# Patient Record
Sex: FEMALE | Race: BLACK OR AFRICAN AMERICAN | NOT HISPANIC OR LATINO | Employment: FULL TIME | URBAN - METROPOLITAN AREA
[De-identification: names, ages, dates, MRNs, and addresses within clinical notes are randomized per-mention and may not be internally consistent; named-entity substitution may affect disease eponyms.]

---

## 2017-01-04 ENCOUNTER — HISTORICAL (OUTPATIENT)
Dept: RADIOLOGY | Facility: HOSPITAL | Age: 52
End: 2017-01-04

## 2017-03-10 ENCOUNTER — HISTORICAL (OUTPATIENT)
Dept: LAB | Facility: HOSPITAL | Age: 52
End: 2017-03-10

## 2017-03-13 ENCOUNTER — HISTORICAL (OUTPATIENT)
Dept: CARDIOLOGY | Facility: HOSPITAL | Age: 52
End: 2017-03-13

## 2017-04-12 ENCOUNTER — HISTORICAL (OUTPATIENT)
Dept: INTENSIVE CARE | Facility: HOSPITAL | Age: 52
End: 2017-04-12

## 2017-05-10 ENCOUNTER — HISTORICAL (OUTPATIENT)
Dept: INTENSIVE CARE | Facility: HOSPITAL | Age: 52
End: 2017-05-10

## 2017-12-20 ENCOUNTER — HISTORICAL (OUTPATIENT)
Dept: MEDSURG UNIT | Facility: HOSPITAL | Age: 52
End: 2017-12-20

## 2017-12-21 LAB
ABS NEUT (OLG): 6.82 X10(3)/MCL (ref 2.1–9.2)
ALBUMIN SERPL-MCNC: 3.6 GM/DL (ref 3.4–5)
ALBUMIN/GLOB SERPL: 0.9 RATIO (ref 1.1–2)
ALP SERPL-CCNC: 107 UNIT/L (ref 38–126)
ALT SERPL-CCNC: 83 UNIT/L (ref 12–78)
AST SERPL-CCNC: 68 UNIT/L (ref 15–37)
BASOPHILS # BLD AUTO: 0 X10(3)/MCL (ref 0–0.2)
BASOPHILS NFR BLD AUTO: 0 %
BILIRUB SERPL-MCNC: 0.3 MG/DL (ref 0.2–1)
BILIRUBIN DIRECT+TOT PNL SERPL-MCNC: 0.1
BILIRUBIN DIRECT+TOT PNL SERPL-MCNC: 0.2 MG/DL (ref 0–0.8)
BUN SERPL-MCNC: 14 MG/DL (ref 7–18)
CALCIUM SERPL-MCNC: 9.1 MG/DL (ref 8.5–10.1)
CHLORIDE SERPL-SCNC: 98 MMOL/L (ref 98–107)
CO2 SERPL-SCNC: 32 MMOL/L (ref 21–32)
CREAT SERPL-MCNC: 0.68 MG/DL (ref 0.55–1.02)
ERYTHROCYTE [DISTWIDTH] IN BLOOD BY AUTOMATED COUNT: 14.4 % (ref 11.5–17)
GLOBULIN SER-MCNC: 4 GM/DL (ref 2.4–3.5)
GLUCOSE SERPL-MCNC: 208 MG/DL (ref 74–106)
HCT VFR BLD AUTO: 45 % (ref 37–47)
HGB BLD-MCNC: 13.7 GM/DL (ref 12–16)
LYMPHOCYTES # BLD AUTO: 1.4 X10(3)/MCL (ref 0.6–4.6)
LYMPHOCYTES NFR BLD AUTO: 17 %
MCH RBC QN AUTO: 25.5 PG (ref 27–31)
MCHC RBC AUTO-ENTMCNC: 30.4 GM/DL (ref 33–36)
MCV RBC AUTO: 83.6 FL (ref 80–94)
MONOCYTES # BLD AUTO: 0.2 X10(3)/MCL (ref 0.1–1.3)
MONOCYTES NFR BLD AUTO: 2 %
NEUTROPHILS # BLD AUTO: 6.82 X10(3)/MCL (ref 2.1–9.2)
NEUTROPHILS NFR BLD AUTO: 81 %
PLATELET # BLD AUTO: 207 X10(3)/MCL (ref 130–400)
PMV BLD AUTO: 12.4 FL (ref 9.4–12.4)
POTASSIUM SERPL-SCNC: 4.2 MMOL/L (ref 3.5–5.1)
PROT SERPL-MCNC: 7.6 GM/DL (ref 6.4–8.2)
RBC # BLD AUTO: 5.38 X10(6)/MCL (ref 4.2–5.4)
SODIUM SERPL-SCNC: 136 MMOL/L (ref 136–145)
WBC # SPEC AUTO: 8.5 X10(3)/MCL (ref 4.5–11.5)

## 2018-03-19 ENCOUNTER — HISTORICAL (OUTPATIENT)
Dept: RADIOLOGY | Facility: HOSPITAL | Age: 53
End: 2018-03-19

## 2018-12-04 ENCOUNTER — HISTORICAL (OUTPATIENT)
Dept: ADMINISTRATIVE | Facility: HOSPITAL | Age: 53
End: 2018-12-04

## 2019-09-06 ENCOUNTER — HISTORICAL (OUTPATIENT)
Dept: ADMINISTRATIVE | Facility: HOSPITAL | Age: 54
End: 2019-09-06

## 2019-09-06 LAB
BUN SERPL-MCNC: 18 MG/DL (ref 7–18)
CALCIUM SERPL-MCNC: 9.1 MG/DL (ref 8.5–10.1)
CHLORIDE SERPL-SCNC: 102 MMOL/L (ref 98–107)
CO2 SERPL-SCNC: 32 MMOL/L (ref 21–32)
CREAT SERPL-MCNC: 0.7 MG/DL (ref 0.55–1.02)
CREAT/UREA NIT SERPL: 25.7
GLUCOSE SERPL-MCNC: 92 MG/DL (ref 74–106)
POTASSIUM SERPL-SCNC: 4.1 MMOL/L (ref 3.5–5.1)
SODIUM SERPL-SCNC: 141 MMOL/L (ref 136–145)

## 2019-11-06 ENCOUNTER — HISTORICAL (OUTPATIENT)
Dept: RADIOLOGY | Facility: HOSPITAL | Age: 54
End: 2019-11-06

## 2019-11-15 ENCOUNTER — HISTORICAL (OUTPATIENT)
Dept: RADIOLOGY | Facility: HOSPITAL | Age: 54
End: 2019-11-15

## 2020-05-21 ENCOUNTER — HISTORICAL (OUTPATIENT)
Dept: LAB | Facility: HOSPITAL | Age: 55
End: 2020-05-21

## 2020-10-15 ENCOUNTER — HISTORICAL (OUTPATIENT)
Dept: LAB | Facility: HOSPITAL | Age: 55
End: 2020-10-15

## 2021-01-18 ENCOUNTER — HISTORICAL (OUTPATIENT)
Dept: RESPIRATORY THERAPY | Facility: HOSPITAL | Age: 56
End: 2021-01-18

## 2021-05-27 ENCOUNTER — HOSPITAL ENCOUNTER (OUTPATIENT)
Dept: MEDSURG UNIT | Facility: HOSPITAL | Age: 56
End: 2021-05-28
Attending: SPECIALIST | Admitting: SPECIALIST

## 2021-05-28 LAB
BUN SERPL-MCNC: 13 MG/DL (ref 9.8–20.1)
CALCIUM SERPL-MCNC: 8.5 MG/DL (ref 8.4–10.2)
CHLORIDE SERPL-SCNC: 101 MMOL/L (ref 98–107)
CO2 SERPL-SCNC: 31 MMOL/L (ref 22–29)
CREAT SERPL-MCNC: 0.75 MG/DL (ref 0.55–1.02)
CREAT/UREA NIT SERPL: 17
GLUCOSE SERPL-MCNC: 148 MG/DL (ref 74–100)
HCT VFR BLD AUTO: 36.6 % (ref 36–48)
HGB BLD-MCNC: 10.9 GM/DL (ref 12–16)
POTASSIUM SERPL-SCNC: 3.9 MMOL/L (ref 3.5–5.1)
SODIUM SERPL-SCNC: 138 MMOL/L (ref 136–145)

## 2021-06-09 ENCOUNTER — HISTORICAL (OUTPATIENT)
Dept: RADIOLOGY | Facility: HOSPITAL | Age: 56
End: 2021-06-09

## 2021-07-07 ENCOUNTER — HISTORICAL (OUTPATIENT)
Dept: RADIOLOGY | Facility: HOSPITAL | Age: 56
End: 2021-07-07

## 2021-07-29 ENCOUNTER — HISTORICAL (OUTPATIENT)
Dept: RADIOLOGY | Facility: HOSPITAL | Age: 56
End: 2021-07-29

## 2021-08-18 ENCOUNTER — HISTORICAL (OUTPATIENT)
Dept: RADIOLOGY | Facility: HOSPITAL | Age: 56
End: 2021-08-18

## 2021-10-20 ENCOUNTER — HISTORICAL (OUTPATIENT)
Dept: RADIOLOGY | Facility: HOSPITAL | Age: 56
End: 2021-10-20

## 2021-12-01 ENCOUNTER — HISTORICAL (OUTPATIENT)
Dept: RADIOLOGY | Facility: HOSPITAL | Age: 56
End: 2021-12-01

## 2022-02-02 ENCOUNTER — HISTORICAL (OUTPATIENT)
Dept: RADIOLOGY | Facility: HOSPITAL | Age: 57
End: 2022-02-02

## 2022-02-02 ENCOUNTER — HISTORICAL (OUTPATIENT)
Dept: ADMINISTRATIVE | Facility: HOSPITAL | Age: 57
End: 2022-02-02

## 2022-02-04 ENCOUNTER — HISTORICAL (OUTPATIENT)
Dept: ADMINISTRATIVE | Facility: HOSPITAL | Age: 57
End: 2022-02-04

## 2022-02-04 ENCOUNTER — HISTORICAL (OUTPATIENT)
Dept: RADIOLOGY | Facility: HOSPITAL | Age: 57
End: 2022-02-04

## 2022-02-14 ENCOUNTER — HISTORICAL (OUTPATIENT)
Dept: ADMINISTRATIVE | Facility: HOSPITAL | Age: 57
End: 2022-02-14

## 2022-02-14 ENCOUNTER — HISTORICAL (OUTPATIENT)
Dept: SURGERY | Facility: HOSPITAL | Age: 57
End: 2022-02-14

## 2022-02-14 LAB
ABS NEUT (OLG): 3.2 (ref 1.5–6.9)
APPEARANCE, UA: NORMAL
B-HCG SERPL QL: NEGATIVE
BACTERIA SPEC CULT: NORMAL
BILIRUB UR QL STRIP: NEGATIVE
BUN SERPL-MCNC: 12 MG/DL (ref 9.8–20.1)
CALCIUM SERPL-MCNC: 9 MG/DL (ref 8.7–10.5)
CHLORIDE SERPL-SCNC: 101 MMOL/L (ref 98–107)
CO2 SERPL-SCNC: 33 MMOL/L (ref 22–29)
COLOR UR: YELLOW
CREAT SERPL-MCNC: 0.73 MG/DL (ref 0.55–1.02)
CREAT/UREA NIT SERPL: 16
DO MICRO?: YES
ERYTHROCYTE [DISTWIDTH] IN BLOOD BY AUTOMATED COUNT: 14.7 % (ref 11.5–17)
GLUCOSE (UA): NEGATIVE
GLUCOSE SERPL-MCNC: 137 MG/DL (ref 74–100)
HCG UR QL IA.RAPID: NEGATIVE
HCG UR QL IA.RAPID: POSITIVE
HCT VFR BLD AUTO: 43.2 % (ref 36–48)
HEMOLYSIS INTERF INDEX SERPL-ACNC: 5
HGB BLD-MCNC: 12.9 G/DL (ref 12–16)
HGB UR QL STRIP: NORMAL
ICTERIC INTERF INDEX SERPL-ACNC: 0
KETONES UR QL STRIP: NEGATIVE
LEUKOCYTE ESTERASE UR QL STRIP: NEGATIVE
LIPEMIC INTERF INDEX SERPL-ACNC: <0
MCH RBC QN AUTO: 25 PG (ref 27–34)
MCHC RBC AUTO-ENTMCNC: 30 G/DL (ref 31–36)
MCV RBC AUTO: 85 FL (ref 80–99)
NITRITE UR QL STRIP: NEGATIVE
PH UR STRIP: 6 [PH] (ref 4.6–8)
PLATELET # BLD AUTO: 159 10*3/UL (ref 140–400)
PMV BLD AUTO: 12.3 FL (ref 6.8–10)
POTASSIUM SERPL-SCNC: 4.1 MMOL/L (ref 3.5–5.1)
PROT UR QL STRIP: NEGATIVE
RBC # BLD AUTO: 5.08 10*6/UL (ref 4.2–5.4)
RBC #/AREA URNS HPF: NORMAL /[HPF] (ref 0–2)
SARS-COV-2 AG RESP QL IA.RAPID: NOT DETECTED
SODIUM SERPL-SCNC: 140 MMOL/L (ref 136–145)
SP GR UR STRIP: 1.01 (ref 1–1.03)
SQUAMOUS EPITHELIAL, UA: NORMAL
UROBILINOGEN UR STRIP-ACNC: 0.2
WBC # SPEC AUTO: 5.6 10*3/UL (ref 4.5–11.5)
WBC #/AREA URNS HPF: NORMAL /[HPF] (ref 0–2)

## 2022-02-16 ENCOUNTER — HISTORICAL (OUTPATIENT)
Dept: ADMINISTRATIVE | Facility: HOSPITAL | Age: 57
End: 2022-02-16

## 2022-02-16 ENCOUNTER — HISTORICAL (OUTPATIENT)
Dept: ANESTHESIOLOGY | Facility: HOSPITAL | Age: 57
End: 2022-02-16

## 2022-04-30 NOTE — OP NOTE
PREOPERATIVE DIAGNOSIS:  Left midshaft spiral humerus fracture.    POSTOPERATIVE DIAGNOSIS:  Left midshaft spiral humerus fracture.    PROCEDURE:  Intramedullary nailing.    ANESTHESIA:  General.    IMPLANT TYPE:  Synthes 7 mm cannulated humeral nail.    COMPLICATIONS:  None.    INDICATION:  The patient tripped and fell, was brought to [facility].  There were no beds at [facility].  She was transferred here in the middle of the night.  Informed consent was obtained.  Risks of the procedure were explained, not excluding infection, bleeding, pain, scarring, neurovascular injury, nonunion, possible need for future surgery, and possible rotator cuff problems.    DESCRIPTION OF PROCEDURE:  She was brought the OR, given IV antibiotics, general anesthesia.  She was morbidly obese.  Placed on the Arthrex shoulder table and secured.  Prepped and draped.  Deltoid incision was made through about 2-3 inches of adipose.  The cuff was identified, carefully inspected.  I made a midline split, and I tagged it with Ethibond for exposure, then put in an entry pin.  Checked it with C-arm, and then did a 10 mm entry hole.  Passed a guidewire down the shaft of the humerus and then down the distal part.  Measured the length, and we selected a 250.  Passed the nail, locked it proximally with a blade, locked it with a 0 end cap, and the nail was appropriately recessed in the humeral head.  Secondary screw proximally and 2 screws distally were placed using fluoro.  She did have some varus of the proximal component, which, of course, is common with this fracture pattern and should not be a     problem.  Following this, the cuff was meticulously repaired with side-to-side 0 Ethibond, subcu #1, 2-0, staples.  Sterile dressing applied.  No complications.        MR/MODL   DD: 05/27/2021 0809   DT: 05/27/2021 0919  Job # [id number]

## 2022-04-30 NOTE — ED PROVIDER NOTES
Patient:   Poppy Weller            MRN: [id number]            FIN: [id number]               Age:   56 years     Sex:  Female     :  1965   Associated Diagnoses:   Left humeral fracture; Atrial fibrillation; Transfer; Shoulder pain-swelling; MAURY - Obstructive sleep apnea; Morbid obesity   Author:   Tonja Velez      Basic Information   Time seen: Date & time 2021 00:20:00.   History source: Patient.   Arrival mode: Ambulance.   History limitation: None.   Additional information: Chief Complaint from Nursing Triage Note : Chief Complaint   2021 0:17 CDT       Chief Complaint           Tx from [facility] with dx of L humerus fx. c/o L knee pain.  .      History of Present Illness   The patient presents with left, shoulder pain.  The onset was just prior to arrival.  The course/duration of symptoms is constant.  Type of injury: fall.  The location where the incident occurred was at home.  Location: Left shoulder. Radiating pain: none. The character of symptoms is pain.  The degree of pain is moderate.  The degree of swelling is none.  The exacerbating factor is movement.  The relieving factor is none.  Risk factors consist of none.  The patient's dominant hand is unknown.  Prior episodes: none.  Therapy today: transfer from another hospital.  Associated symptoms: none.  Additional history: she tripped bringing [employer] cans to the road and fell on her arm. none .        Review of Systems   Constitutional symptoms:  Negative except as documented in HPI, no fever, no chills, no sweats, no weakness, no fatigue, no decreased activity.    Skin symptoms:  Negative except as documented in HPI, no jaundice, no rash, no pruritus, no abrasions, no breakdown, no burns, no dryness, no petechiae, no lesion.    Eye symptoms:  Negative except as documented in HPI, no recent vision problems, no pain, no discharge, no icterus, no diplopia, no blurred vision, no blindness.    ENMT symptoms:   Negative except as documented in HPI, no ear pain, no sore throat, no nasal congestion, no sinus pain.    Respiratory symptoms:  Negative except as documented in HPI, no shortness of breath, no orthopnea, no cough, no hemoptysis, no sputum production, no stridor, no wheezing.    Cardiovascular symptoms:  Negative except as documented in HPI, no chest pain, no palpitations, no tachycardia, no syncope, no diaphoresis, no peripheral edema.    Gastrointestinal symptoms:  Negative except as documented in HPI, no abdominal pain, no nausea, no vomiting, no diarrhea, no constipation, no rectal bleeding, no rectal pain.    Genitourinary symptoms:  Negative except as documented in HPI, no dysuria, no hematuria.    Musculoskeletal symptoms:  Muscle pain, Joint pain, no back pain, no Claudication.    Neurologic symptoms:  Negative except as documented in HPI, no headache, no dizziness, no altered level of consciousness, no numbness, no tingling, no weakness.    Psychiatric symptoms:  Negative except as documented in HPI, no anxiety, no depression, no sleeping problems, no substance abuse.    Endocrine symptoms:  Negative except as documented in HPI, no polyuria, no polydipsia, no polyphagia, no hyperglycemia, no hypoglycemia.    Hematologic/Lymphatic symptoms:  Negative except as documented in HPI, bleeding tendency negative, bruising tendency negative, no petechiae, no gum bleeding, no swollen nodes.    Allergy/immunologic symptoms:  Negative except as documented in HPI, no seasonal allergies, no food allergies, no recurrent infections, no impaired immunity.              Additional review of systems information: All other systems reviewed and otherwise negative.      Health Status   Allergies:    Allergies (1) Active Reaction  No Known Allergies None Documented  .   Medications:  (Selected)   Inpatient Medications  Ordered  Ativan: 1 mg, form: Injection, IV Push, Once, first dose 05/27/21 0:25:00 CDT, stop date 05/27/21  0:25:00 CDT, STAT, Rate of injection should not exceed 2 mg/minute  Outpatient Medications  Ordered  Lidocaine inj.: 5 mL, Intra-Articular, Once, first dose 20 8:35:00 CST, stop date 20 8:35:00 CST  Prescriptions  Prescribed  Plavix 75 mg oral tablet: 75 mg = 1 tab(s), Oral, Daily, # 30 tab(s), 11 Refill(s)  Voltaren 1% topical gel: 1 shiv, TOP, QID, # 100 gm, 0 Refill(s), Pharmacy: [id number]  Zofran ODT 4 mg oral tablet, disintegratin mg = 1 tab(s), Oral, q4hr, # 12 tab(s), 0 Refill(s)  lidocaine 5% topical film: 1 patch(es), TOP, Daily, # 30 patch(es), 0 Refill(s), Pharmacy: [facility] #[id number], 175, cm, Height/Length Dosing, 21 10:31:00 CDT, 180, kg, Weight Dosing, 21 10:31:00 CDT  ranitidine 150 mg oral tablet: 150 mg = 1 tab(s), Oral, BID, # 60 tab(s), 0 Refill(s)  Documented Medications  Documented  Cartia  mg/24 hours oral capsule, extended release: 300 mg = 1 cap(s), Oral, Daily, # 30 cap(s), 0 Refill(s)  Eliquis 5 mg oral tablet: 5 mg = 1 tab(s), Oral, BID, # 30 tab(s), 0 Refill(s)  Lasix 20 mg oral tablet: 20 mg = 1 tab(s), Oral, Daily, PRN PRN other (see comment), # 30 tab(s), 0 Refill(s)  Livalo 2 mg oral tablet: 2 mg = 1 tab(s), Oral, Daily  Norco 5 mg-325 mg oral tablet: 1 tab(s), Oral, q4hr, PRN PRN for pain, 0 Refill(s)  Saxenda 18 mg/3 mL subcutaneous solution: 3 mg, Subcutaneous, Daily, # 15 mL, 0 Refill(s)  Vitamin C 1000 mg oral capsule: 1 tab, Oral, Daily, 0 Refill(s)  Vitamin D3 5000 intl units oral tablet: 5,000 IntUnit = 1 tab(s), Oral, Daily, # 100 tab(s), 0 Refill(s)  hydroCHLOROthiazide-lisinopril 12.5 mg-20 mg oral tablet: 1 tab(s), Oral, Daily, # 30 tab(s), 0 Refill(s)  hydrochlorothiazide 12.5 mg oral tablet: 12.5 mg = 1 tab(s), Oral, qAM  hydrochlorothiazide-losartan 12.5 mg-50 mg oral tablet: 1 tab(s), Oral, Daily  ibuprofen 600 mg oral tablet: 600 mg = 1 tab(s), Oral, TID  losartan 50 mg oral tablet: 50 mg = 1 tab(s), Oral, qAM  multivitamin  with minerals (Adult Tab): 1 tab(s), Oral, Daily, # 30 tab(s), 0 Refill(s)  sotalol 80 mg oral tablet: 80 mg = 1 tab(s), Oral, BID, # 60 tab(s), 0 Refill(s)  traMADol 50 mg oral tablet: 50 mg = 1 tab(s), Oral, QID.   Immunizations: Include Immunizations   Previous  No previous immunizations have been selected or recorded.   Future  No future immunizations have been selected or recorded. .      Past Medical/ Family/ Social History   Medical history:    Resolved  Atrial fibrillation (SNOMED CT 44193981):  Resolved.  HTN - Hypertension (SNOMED CT 0485757833):  Resolved.  MAURY - Obstructive sleep apnea (SNOMED CT 5008434871):  Resolved.  Pericarditis (SNOMED CT 1754876):  Resolved., Reviewed as documented in chart.   Surgical history:    Varicose vein stripping ([id number]) on 1/1/2019 at 53 Years.  Cardioversion (., None) on 2/21/2017 at 51 Years.  Comments:  2/21/2017 10:00 Power Sheppard RN  auto-populated from documented surgical case  Transesophageal Echo (for Surgery) (., None) on 2/21/2017 at 51 Years.  Comments:  2/21/2017 10:00 Power Sheppard RN  auto-populated from documented surgical case  Pericardial Window (.) on 2/16/2017 at 51 Years.  Comments:  2/16/2017 13:26 CST -   auto-populated from documented surgical case  Appendectomy ([id number]).  Cholecystectomy (85946549).  Comments:  2/15/2017 18:23 JUSTINA Dave RN, Frida HENSLEY  1996  Tonsillectomy ([id number]).  Pericardial window operation ([id number]).  Fluoroscopic venogram of both lower limbs ([id number])., Reviewed as documented in chart.   Family history:    Father  Heart failure.  Hypertension.  Metastatic cancer  Mother  Heart failure.  Hypertension.  Diabetes mellitus type 2  , Reviewed as documented in chart.   Social history: Reviewed as documented in chart.   Problem list:    Active Problems (4)  Atrial fibrillation   H/O: HTN (hypertension)   Morbid obesity   Sleep apnea   .      Physical Examination               Vital Signs    Vital Signs   5/27/2021 0:17 CDT       Peripheral Pulse Rate     81 bpm                             Respiratory Rate          20 br/min                             SpO2                      99 %                             Oxygen Therapy            Nasal cannula                             Oxygen Flow Rate          2 L/min                             Systolic Blood Pressure   118 mmHg                             Diastolic Blood Pressure  99 mmHg  HI  .   General:  Alert, no acute distress.    Skin:  Warm, intact, normal for ethnicity.    Head:  Normocephalic, atraumatic.    Neck:  Supple, trachea midline.    Eye:  Extraocular movements are intact, normal conjunctiva, vision grossly normal.    Ears, nose, mouth and throat:  Oral mucosa moist.   Cardiovascular:  Regular rate and rhythm, No edema.    Respiratory:  Lungs are clear to auscultation, respirations are non-labored, breath sounds are equal.    Chest wall:  No tenderness, No deformity.    Musculoskeletal:  Proximal upper extremity: Left, shoulder, arm, humerus, tenderness, swelling, deformity.    Musculoskeletal:  Normal ROM, no deformity.        Gastrointestinal:  Soft, Nontender, Non distended, Normal bowel sounds.    Neurological:  Alert and oriented to person, place, time, and situation, No focal neurological deficit observed, normal speech observed.    Lymphatics:  No lymphadenopathy.   Psychiatric:  Cooperative.      Medical Decision Making   Differential Diagnosis:  Fracture, humerus.   Documents reviewed:  Emergency department nurses' notes.   Orders  Launch Order Profile (Selected)   Inpatient Orders  Ordered  Ativan: 1 mg, form: Injection, IV Push, Once, first dose 05/27/21 0:25:00 CDT, stop date 05/27/21 0:25:00 CDT, STAT, Rate of injection should not exceed 2 mg/minute  Completed  LORazepam: 2 mg, 1 mL, Injection, N/A, Once, Stop date 05/27/21 0:25:44 CDT, Physician Stop, 05/27/21 0:25:44 CDT.   Results review:     No qualifying data available.  "     Reexamination/ Reevaluation   spoke with kasey Rodriguez to admit      Impression and Plan   Diagnosis   Left humeral fracture (DMO22-WF S42.302A)   Atrial fibrillation (DDV28-XM I48.91)   Transfer (PNED [id number])   Shoulder pain-swelling (PNED [id number])   MAURY - Obstructive sleep apnea (PSX05-ZZ G47.33)   Morbid obesity (GMT52-NW E66.01)   Plan   Disposition: Admit time  5/27/2021 00:47:00, Admit to Inpatient Unit.    Orders: Launch Orders   Admit/Transfer/Discharge:  Admit as Inpatient (Order): 5/27/2021 0:48 CDT, Medical Unit Ulysses DIAZ, Christopher R, No  .    Notes: "I, Tonja Velez CMA, am scribing for and in the presence of Dr. Jarrett."   5/27/2021 0020.  .       Addendum   I, MANDEEP Jarrett MD, personally performed the services described in this documentation, as scribed in my presence, and it is both accurate and complete.   Tojna Velez acted as scribe and she is a certified MA who was present during entire interaction with this patient.    "

## 2022-05-02 DIAGNOSIS — Z12.31 ENCOUNTER FOR SCREENING MAMMOGRAM FOR BREAST CANCER: Primary | ICD-10-CM

## 2022-05-02 DIAGNOSIS — Z01.818 PRE-OP TESTING: ICD-10-CM

## 2022-05-04 ENCOUNTER — HOSPITAL ENCOUNTER (OUTPATIENT)
Dept: RADIOLOGY | Facility: HOSPITAL | Age: 57
Discharge: HOME | End: 2022-05-04
Attending: STUDENT IN AN ORGANIZED HEALTH CARE EDUCATION/TRAINING PROGRAM
Payer: COMMERCIAL

## 2022-05-04 DIAGNOSIS — Z12.31 ENCOUNTER FOR SCREENING MAMMOGRAM FOR BREAST CANCER: ICD-10-CM

## 2022-05-04 PROCEDURE — 77067 MAMMO DIGITAL SCREENING BILAT WITH TOMO: ICD-10-PCS | Mod: 26,,, | Performed by: RADIOLOGY

## 2022-05-04 PROCEDURE — 77063 BREAST TOMOSYNTHESIS BI: CPT | Mod: 26,,, | Performed by: RADIOLOGY

## 2022-05-04 PROCEDURE — 77063 MAMMO DIGITAL SCREENING BILAT WITH TOMO: ICD-10-PCS | Mod: 26,,, | Performed by: RADIOLOGY

## 2022-05-04 PROCEDURE — 77067 SCR MAMMO BI INCL CAD: CPT | Mod: 26,,, | Performed by: RADIOLOGY

## 2022-05-04 PROCEDURE — 77067 SCR MAMMO BI INCL CAD: CPT | Mod: TC

## 2022-05-04 NOTE — HISTORICAL [facility] CERNER
This is a historical note converted from [employer]. Formatting and pictures may have been removed.  Please reference Renee for original formatting and attached multimedia. Chief Complaint  Tx from [facility] with dx of L humerus fx. c/o L knee pain.  History of Present Illness  This is a 56-year-old?female?who was injured during a fall at home.? The patient has issues with her left knee following a work injury.? 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 ?She states she was taking out the trash yesterday evening?when her left knee gave out on her resulting in a fall?she fell onto her left humerus. ?She was brought to [facility]?emergency department?was?evaluated x-rayed and found to have a left midshaft?spiral humeral fracture. ?She was transferred to our facility for orthopedic?evaluation and services.? The patient did have?pericarditis in 2017 for which she had to have a pericardial window.? She developed atrial fibrillation?and for that she is currently on Eliquis 5 mg twice daily.  Review of Systems  Constitutional:?no fever, fatigue, weakness  Eye:?no vision loss, eye redness, drainage, or pain  ENMT:?no sore throat, ear pain, sinus pain/congestion, nasal congestion/drainage  Respiratory:?no cough, no wheezing, no shortness of breath  Cardiovascular:?no chest pain, no dyspnea, no palpitations, no edema  Gastrointestinal:?no nausea, vomiting, or diarrhea. No abdominal pain  Genitourinary:?no dysuria, no urinary frequency or urgency, no hematuria  Hema/Lymph:?no abnormal bruising or bleeding  Endocrine:?no heat or cold intolerance, no excessive thirst or excessive urination  Musculoskeletal:???Left upper extremity: Left?humeral pain.  Integumentary:?no skin rash or abnormal lesion  Neurologic: no headache, no dizziness, no weakness or numbness  ?  Physical Exam  Vitals & Measurements  T:?36.2? ?C (Oral)? TMIN:?36? ?C (Temporal Artery)? TMAX:?36.6? ?C (Oral)? HR:?68(Peripheral)? RR:?14?  BP:?104/53? SpO2:?91%? WT:?184.8?kg? BMI:?67.88?  General:?well-developed well-nourished in no acute distress  Eye: EOMI, clear conjunctiva, eyelids normal  HENT:?Normocephalic/atraumatic, oropharynx and nasal mucosal surfaces moist  Neck: full range of motion, no lymphadenopathy  Respiratory:?Non-labored breathing  Cardiovascular:?regular rate and rhythm, no edema  Gastrointestinal:?soft, non-tender, non-distended, without masses to palpation  Genitourinary: no CVA tenderness to palpation  Musculoskeletal:?? Left upper extremity.? There is significant tenderness to palpation?over the?proximal to mid?humerus.? Skin is intact. ?She is neurovascular intact to the distal extremity. ?There is full range of motion with some pain to the elbow.  Integumentary: no rashes or skin lesions present  Neurologic: cranial nerves intact, no signs of peripheral neurological deficit, motor/sensory function intact  ?  Assessment/Plan  Atrial fibrillation?I48.91  Left humeral fracture?S42.302A  ?Left humerus midshaft spiral fracture. ?We will proceed with open reduction internal fixation.? Risks and benefits of the surgery were discussed with the patient?questions were answered and consents were signed.  Morbid obesity?E66.01  MAURY - Obstructive sleep apnea?G47.33  Shoulder pain-swelling?[id number]  Transfer?[id number]   Problem List/Past Medical History  Ongoing  Atrial fibrillation  H/O: HTN (hypertension)  Morbid obesity  Sleep apnea  Historical  Atrial fibrillation  HTN - Hypertension  MAURY - Obstructive sleep apnea  Pericarditis  Procedure/Surgical History  Intramedullary Ananth Insertion Humerus (Left) (05/27/2021)  Varicose vein stripping (01/01/2019)  Dilation of Left Common Iliac Vein with Intraluminal Device, Percutaneous Approach (12/20/2017)  Dilation of Right Common Iliac Vein with Intraluminal Device, Percutaneous Approach (12/20/2017)  Fluoroscopy of Bilateral Lower  Extremity Veins using Low Osmolar Contrast (12/20/2017)  Injection procedure for extremity venography (including introduction of needle or intracatheter) (12/20/2017)  Intravascular ultrasound (noncoronary vessel) during diagnostic evaluation and/or therapeutic intervention, including radiological supervision and interpretation; each additional noncoronary vessel (List separately in addition to code for primary procedur (12/20/2017)  Intravascular ultrasound (noncoronary vessel) during diagnostic evaluation and/or therapeutic intervention, including radiological supervision and interpretation; initial noncoronary vessel (List separately in addition to code for primary procedure) (12/20/2017)  Transcatheter placement of an intravascular stent(s), open or percutaneous, including radiological supervision and interpretation and including angioplasty within the same vessel, when performed; each additional vein (List separately in addition to code f (12/20/2017)  Transcatheter placement of an intravascular stent(s), open or percutaneous, including radiological supervision and interpretation and including angioplasty within the same vessel, when performed; initial vein (12/20/2017)  Ultrasonography of Bilateral Lower Extremity Veins, Intravascular (12/20/2017)  Monitoring of Sleep, External Approach (04/12/2017)  Polysomnography; age 6 years or older, sleep staging with 4 or more additional parameters of sleep, attended by a technologist (04/12/2017)  Catheter placement in coronary artery(s) for coronary angiography, including intraprocedural injection(s) for coronary angiography, imaging supervision and interpretation; with left heart catheterization including intraprocedural injection(s) for left hilda (03/13/2017)  Fluoroscopy of Left Heart using Low Osmolar Contrast (03/13/2017)  Fluoroscopy of Multiple Coronary Arteries using Low Osmolar Contrast (03/13/2017)  Measurement of Cardiac Sampling and Pressure, Left Heart,  Percutaneous Approach (03/13/2017)  Cardioversion (., None) (02/21/2017)  Restoration of Cardiac Rhythm, Single (02/21/2017)  Transesophageal Echo (for Surgery) (., None) (02/21/2017)  Ultrasonography of Heart with Aorta, Transesophageal (02/21/2017)  Drainage of Pericardial Cavity with Drainage Device, Percutaneous Approach (02/16/2017)  Pericardial Window (.) (02/16/2017)  Appendectomy  Cholecystectomy  Fluoroscopic venogram of both lower limbs  Pericardial window operation  Tonsillectomy   Medications  Inpatient  albuterol, 2.5 mg= 3 mL, NEB, q4hr, PRN  ascorbic acid, 500 mg= 2 tab(s), Oral, At Bedtime  Ativan, 1 mg= 0.5 mL, IV Push, q4hr, PRN  ceFAZolin  Dilaudid, 1 mg= 0.5 mL, IV, q4hr, PRN  Dilaudid, 1 mg= 0.5 mL, IV Push, q2hr, PRN  diltiazem 120 mg/24 hours oral CAPsule extended release, 120 mg= 1 cap(s), Oral, Daily  diltiazem 180 mg/24 hours oral CAPsule, extended release, 180 mg= 1 cap(s), Oral, Daily  Dulcolax Laxative 10 mg RECTAL suppository, 10 mg= 1 supp, ND (rectal), Daily, PRN  Dulcolax Laxative 5 mg ORAL enteric coated tablet, 10 mg= 2 tab(s), Oral, Daily, PRN  hydrochlorothiazide, 12.5 mg= 0.5 tab(s), Oral, qAM  Lactated Ringers Injection intravenous solution 1,000 mL, 1000 mL, IV  Lactated Ringers Injection intravenous solution 1,000 mL, 1000 mL, IV  Lidocaine inj., 5 mL, Intra-Articular, Once  losartan 50 mg oral tablet, 50 mg= 1 tab(s), Oral, qAM  morphine 4 mg/mL preservative-free injectable solution, 4 mg= 1 mL, IV Push, q3hr, PRN  Norco 10 mg-325 mg oral tablet, 1 tab(s), Oral, q4hr, PRN  Percocet 5/325, 1 tab(s), Oral, q6hr, PRN  Protonix IV 40 mg intravenous injection +  100 mL  Restoril 15 mg oral capsule, 15 mg= 1 cap(s), Oral, At Bedtime, PRN  Senokot S, 2 tab(s), Oral, At Bedtime, PRN  sotalol 80 mg oral tablet, 80 mg= 1 tab(s), Oral, BID  Surfak, 240 mg= 1 cap(s), Oral, qPM  Toradol, 30 mg= 1 mL, IV Push, q6hr  Valium, 5 mg= 1 tab(s), Oral, q6hr, PRN  Zofran, 4 mg= 2 mL, IV  Push, q4hr, PRN  Zofran, 8 mg= 2 tab(s), Oral, q8hr, PRN  Zofran INJ. (IV Push / IM) 2 mg/mL, 4 mg= 2 mL, IV Push, q6hr, PRN  Home  Cartia  mg/24 hours oral capsule, extended release, 300 mg= 1 cap(s), Oral, Daily  Eliquis 5 mg oral tablet, 5 mg= 1 tab(s), Oral, BID  furosemide 40 mg oral tablet, 40 mg= 1 tab(s), Oral, Daily  hydrochlorothiazide 12.5 mg oral tablet, 12.5 mg= 1 tab(s), Oral, qAM,? ?Not Taking per Prescriber  hydroCHLOROthiazide-lisinopril 12.5 mg-20 mg oral tablet, 1 tab(s), Oral, Daily,? ?Not taking  hydrochlorothiazide-losartan 12.5 mg-50 mg oral tablet, 1 tab(s), Oral, Daily  ibuprofen 600 mg oral tablet, 600 mg= 1 tab(s), Oral, TID,? ?Not taking: prn  Lasix 20 mg oral tablet, 20 mg= 1 tab(s), Oral, Daily, PRN,? ?Not taking  lidocaine 5% topical film, 1 patch(es), TOP, Daily,? ?Not taking  Livalo 2 mg oral tablet, 2 mg= 1 tab(s), Oral, Daily,? ?Not taking  losartan 50 mg oral tablet, 50 mg= 1 tab(s), Oral, qAM,? ?Not taking  multivitamin with minerals (Adult Tab), 1 tab(s), Oral, Daily  Norco 5 mg-325 mg oral tablet, 1 tab(s), Oral, q4hr, PRN,? ?Not taking: Last Dose Date/Time Unknown  omeprazole 40 mg oral DR capsule, 40 mg= 1 cap(s), Oral, Daily  Plavix 75 mg oral tablet, 75 mg= 1 tab(s), Oral, Daily, 11 refills,? ?Not Taking per Prescriber  ranitidine 150 mg oral tablet, 150 mg= 1 tab(s), Oral, BID,? ?Not taking  Saxenda 18 mg/3 mL subcutaneous solution, 3 mg, Subcutaneous, Daily,? ?Not taking  sotalol 80 mg oral tablet, 80 mg= 1 tab(s), Oral, BID  traMADol 50 mg oral tablet, 50 mg= 1 tab(s), Oral, QID,? ?Not taking: prn  Vitamin C 1000 mg oral capsule, 1 tab, Oral, Daily  Vitamin D3 5000 intl units oral tablet, 5000 IntUnit= 1 tab(s), Oral, Daily  Voltaren 1% topical gel, 1 shiv, TOP, QID,? ?Not taking  Zofran ODT 4 mg oral tablet, disintegrating, 4 mg= 1 tab(s), Oral, q4hr,? 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 crutches, 11/06/2019  Substance Use  Never, 05/27/2021  Tobacco  Never (less than 100 in lifetime), N/A, 05/27/2021  Never (less than 100 in lifetime), No, 05/27/2021  Never (less than 100 in lifetime), N/A, 05/26/2021  Family History  Diabetes mellitus type 2: Mother.  Heart failure.: Mother and Father.  Hypertension.: Mother and Father.  Metastatic cancer: Father.  Lab Results  Test Name Test Result Date/Time   Chloride 99 mmol/L 05/26/2021 21:17 CDT   CO2 27 mmol/L 05/26/2021 21:17 CDT   BUN 15.3 mg/dL 05/26/2021 21:17 CDT   Creatinine 0.73 mg/dL 05/26/2021 21:17 CDT   PT 9.7 second(s) 05/26/2021 21:17 CDT   INR 0.91 (Low) 05/26/2021 21:17 CDT   PTT 31.4 second(s) 05/26/2021 21:17 CDT   WBC 8.9 x10(3)/mcL 05/26/2021 21:17 CDT   Hgb 14.0 gm/dL 05/26/2021 21:17 CDT   Hct 46.7 % 05/26/2021 21:17 CDT   Platelet 182 x10(3)/mcL 05/26/2021 21:17 CDT

## 2022-05-04 NOTE — HISTORICAL [facility]ER
This is a historical note converted from Renee. Formatting and pictures may have been removed.  Please reference Ceranjel for original formatting and attached multimedia. Admit and Discharge Dates  Admit Date: 05/27/2021  Discharge Date: 05/28/2021  Physicians  Attending Physician - Ulysses DIAZ, Christopher MONK  Admitting Physician - Ulysses DIAZ, Christopher MONK  Primary Care Physician - Tyler DIAZ, Jermaine A  Discharge Diagnosis  Atrial fibrillation?I48.91  Left humeral fracture?S42.302A  Morbid obesity?E66.01  MAURY - Obstructive sleep apnea?G47.33  Shoulder pain-swelling?[id number]  Transfer?[id number]  Surgical Procedures  05/27/2021 - [id number] - Intramedullary Ananth Insertion Humerus  Immunizations  No immunizations recorded for this visit.  Significant Findings  No Astrid-operative complications  Objective  Vitals & Measurements  T:?36.4? ?C (Axillary)? TMIN:?36.2? ?C (Oral)? TMAX:?37.1? ?C (Oral)? HR:?79(Monitored)? RR:?20? BP:?122/74? SpO2:?97%?  Physical Exam  General:?well-developed well-nourished in no acute distress  Eye: EOMI, clear conjunctiva, eyelids normal  HENT:?Normocephalic/atraumatic, oropharynx and nasal mucosal surfaces moist  Neck: full range of motion, no lymphadenopathy  Respiratory:?Non-labored breathing  Cardiovascular:?regular rate and rhythm, no edema  Gastrointestinal:?soft, non-tender, non-distended, without masses to palpation  Genitourinary: no CVA tenderness to palpation  Musculoskeletal:?? Left upper extremity:? Dressings are clean dry and in tact, patient is NV intact to the distal extremity. There is mild swelling to the hand.  Integumentary: no rashes or skin lesions present  Neurologic: cranial nerves intact, no signs of peripheral neurological deficit, motor/sensory function intact  ?  Patient Discharge Condition  Stable, denies chest pain, calf pain, dyspnea or shortness of breath. Hemoglobin is 10.9 hematocrit is 36.6  Discharge  Disposition  Patient is discharged to home, declines home health.? She is given prescriptions for Percocet 10/325mg q 6 hrs prn, Toradol 10mg q 8 hrs prn for pain and inflammation, Duricef 500mg BID for 10days.? Patient will resume Eliquis 5mg BID.? She will follow up with Dr. Arora 6/9/21 @ 3:00pm.   Discharge Medication Reconciliation  Discharge Med Rec is not complete  Education and Orders Provided  Humerus Fracture Treated With ORIF  Discharge - 05/28/21 10:34:00 CDT, Home, May d/c to home. ?Percocet 10/325mg q 6 hrs prn, Toradol 10mg q 8hr prn, Duricef 500mg BID x 10d, resume Eliquis 5mg BID. Follow up with Dr. Arora 6/9/21 @ 3:00?  Car Seat Challenge  No Qualifying Data

## 2022-05-05 ENCOUNTER — APPOINTMENT (OUTPATIENT)
Dept: LAB | Facility: HOSPITAL | Age: 57
End: 2022-05-05
Attending: STUDENT IN AN ORGANIZED HEALTH CARE EDUCATION/TRAINING PROGRAM
Payer: COMMERCIAL

## 2022-05-05 DIAGNOSIS — N95.0 POSTMENOPAUSAL BLEEDING: Primary | ICD-10-CM

## 2022-05-05 LAB
ALBUMIN SERPL-MCNC: 3.7 GM/DL (ref 3.5–5)
ALBUMIN/GLOB SERPL: 1.2 RATIO (ref 1.1–2)
ALP SERPL-CCNC: 109 UNIT/L (ref 40–150)
ALT SERPL-CCNC: 13 UNIT/L (ref 0–55)
AST SERPL-CCNC: 17 UNIT/L (ref 5–34)
BILIRUBIN DIRECT+TOT PNL SERPL-MCNC: 0.1 MG/DL (ref 0–0.5)
BILIRUBIN DIRECT+TOT PNL SERPL-MCNC: 0.2 MG/DL (ref 0–0.8)
BILIRUBIN DIRECT+TOT PNL SERPL-MCNC: 0.3 MG/DL
BUN SERPL-MCNC: 16.6 MG/DL (ref 9.8–20.1)
CALCIUM SERPL-MCNC: 9.3 MG/DL (ref 8.4–10.2)
CHLORIDE SERPL-SCNC: 104 MMOL/L (ref 98–107)
CO2 SERPL-SCNC: 28 MMOL/L (ref 22–29)
CREAT SERPL-MCNC: 0.72 MG/DL (ref 0.55–1.02)
ERYTHROCYTE [DISTWIDTH] IN BLOOD BY AUTOMATED COUNT: 15.7 % (ref 11.5–17)
FSH SERPL-ACNC: 23.61 MIU/ML
GLOBULIN SER-MCNC: 3 GM/DL (ref 2.4–3.5)
GLUCOSE SERPL-MCNC: 128 MG/DL (ref 74–100)
HCT VFR BLD AUTO: 36.1 % (ref 37–47)
HGB BLD-MCNC: 10.9 GM/DL (ref 12–16)
MCH RBC QN AUTO: 25.8 PG (ref 27–31)
MCHC RBC AUTO-ENTMCNC: 30.2 MG/DL (ref 33–36)
MCV RBC AUTO: 85.3 FL (ref 80–94)
NRBC BLD AUTO-RTO: 0 %
PLATELET # BLD AUTO: 204 X10(3)/MCL (ref 130–400)
PMV BLD AUTO: 12.7 FL (ref 9.4–12.4)
POTASSIUM SERPL-SCNC: 4.1 MMOL/L (ref 3.5–5.1)
PROT SERPL-MCNC: 6.7 GM/DL (ref 6.4–8.3)
RBC # BLD AUTO: 4.23 X10(6)/MCL (ref 4.2–5.4)
SODIUM SERPL-SCNC: 141 MMOL/L (ref 136–145)
TSH SERPL-ACNC: 0.9 UIU/ML (ref 0.35–4.94)
WBC # SPEC AUTO: 6.5 X10(3)/MCL (ref 4.5–11.5)

## 2022-05-05 PROCEDURE — 80053 COMPREHEN METABOLIC PANEL: CPT

## 2022-05-05 PROCEDURE — 83001 ASSAY OF GONADOTROPIN (FSH): CPT

## 2022-05-05 PROCEDURE — 85027 COMPLETE CBC AUTOMATED: CPT

## 2022-05-05 PROCEDURE — 84443 ASSAY THYROID STIM HORMONE: CPT

## 2022-05-05 PROCEDURE — 36415 COLL VENOUS BLD VENIPUNCTURE: CPT

## 2022-05-13 ENCOUNTER — LAB VISIT (OUTPATIENT)
Dept: LAB | Facility: HOSPITAL | Age: 57
End: 2022-05-13
Attending: INTERNAL MEDICINE
Payer: COMMERCIAL

## 2022-05-13 ENCOUNTER — HOSPITAL ENCOUNTER (OUTPATIENT)
Dept: RADIOLOGY | Facility: HOSPITAL | Age: 57
Discharge: HOME | End: 2022-05-13
Attending: INTERNAL MEDICINE
Payer: COMMERCIAL

## 2022-05-13 DIAGNOSIS — E55.9 VITAMIN D DEFICIENCY: Primary | ICD-10-CM

## 2022-05-13 DIAGNOSIS — Z01.818 PREOP EXAMINATION: ICD-10-CM

## 2022-05-13 DIAGNOSIS — I10 HYPERTENSION, UNSPECIFIED TYPE: ICD-10-CM

## 2022-05-13 DIAGNOSIS — Z01.818 PRE-OP TESTING: Primary | ICD-10-CM

## 2022-05-13 DIAGNOSIS — Z00.00 WELL ADULT EXAM: ICD-10-CM

## 2022-05-13 DIAGNOSIS — Z51.89 ENCOUNTER FOR MEDICATION ADJUSTMENT: ICD-10-CM

## 2022-05-13 DIAGNOSIS — E78.5 DYSLIPIDEMIA: ICD-10-CM

## 2022-05-13 DIAGNOSIS — Z01.818 PRE-OP TESTING: ICD-10-CM

## 2022-05-13 LAB
ALBUMIN SERPL-MCNC: 3.7 GM/DL (ref 3.5–5)
ALBUMIN/GLOB SERPL: 1.1 RATIO (ref 1.1–2)
ALP SERPL-CCNC: 97 UNIT/L (ref 40–150)
ALT SERPL-CCNC: 17 UNIT/L (ref 0–55)
APPEARANCE UR: CLEAR
APTT PPP: 30.1 SECONDS (ref 23.2–33.7)
AST SERPL-CCNC: 17 UNIT/L (ref 5–34)
BACTERIA #/AREA URNS AUTO: ABNORMAL /HPF
BILIRUB UR QL STRIP.AUTO: ABNORMAL MG/DL
BILIRUBIN DIRECT+TOT PNL SERPL-MCNC: 0.4 MG/DL
BUN SERPL-MCNC: 12 MG/DL (ref 9.8–20.1)
CALCIUM SERPL-MCNC: 9.4 MG/DL (ref 8.4–10.2)
CHLORIDE SERPL-SCNC: 104 MMOL/L (ref 98–107)
CHOLEST SERPL-MCNC: 194 MG/DL
CHOLEST/HDLC SERPL: 4 {RATIO} (ref 0–5)
CO2 SERPL-SCNC: 29 MMOL/L (ref 22–29)
COLOR UR AUTO: YELLOW
CREAT SERPL-MCNC: 0.7 MG/DL (ref 0.55–1.02)
DEPRECATED CALCIDIOL+CALCIFEROL SERPL-MC: 58 NG/ML (ref 30–80)
ERYTHROCYTE [DISTWIDTH] IN BLOOD BY AUTOMATED COUNT: 16.9 % (ref 11.5–17)
EST. AVERAGE GLUCOSE BLD GHB EST-MCNC: 151.3 MG/DL
GLOBULIN SER-MCNC: 3.5 GM/DL (ref 2.4–3.5)
GLUCOSE SERPL-MCNC: 124 MG/DL (ref 74–100)
GLUCOSE UR QL STRIP.AUTO: NEGATIVE MG/DL
HBA1C MFR BLD: 6.9 %
HCT VFR BLD AUTO: 34.1 % (ref 37–47)
HDLC SERPL-MCNC: 51 MG/DL (ref 35–60)
HGB BLD-MCNC: 9.9 GM/DL (ref 12–16)
INR BLD: 1.05 (ref 0–1.3)
KETONES UR QL STRIP.AUTO: ABNORMAL MG/DL
LDLC SERPL CALC-MCNC: 133 MG/DL (ref 50–140)
LEUKOCYTE ESTERASE UR QL STRIP.AUTO: ABNORMAL UNIT/L
MCH RBC QN AUTO: 25.6 PG (ref 27–31)
MCHC RBC AUTO-ENTMCNC: 29 MG/DL (ref 33–36)
MCV RBC AUTO: 88.3 FL (ref 80–94)
NITRITE UR QL STRIP.AUTO: NEGATIVE
PH UR STRIP.AUTO: 7 [PH]
PLATELET # BLD AUTO: 222 X10(3)/MCL (ref 130–400)
PMV BLD AUTO: 11 FL (ref 9.4–12.4)
POTASSIUM SERPL-SCNC: 4.2 MMOL/L (ref 3.5–5.1)
PROT SERPL-MCNC: 7.2 GM/DL (ref 6.4–8.3)
PROT UR QL STRIP.AUTO: 100 MG/DL
PROTHROMBIN TIME: 11 SECONDS (ref 12.5–14.5)
RBC # BLD AUTO: 3.86 X10(6)/MCL (ref 4.2–5.4)
RBC #/AREA URNS AUTO: ABNORMAL /HPF
RBC UR QL AUTO: ABNORMAL UNIT/L
SODIUM SERPL-SCNC: 143 MMOL/L (ref 136–145)
SP GR UR STRIP.AUTO: 1.02
SQUAMOUS #/AREA URNS AUTO: ABNORMAL /LPF
TRIGL SERPL-MCNC: 50 MG/DL (ref 37–140)
UROBILINOGEN UR STRIP-ACNC: 1 MG/DL
VLDLC SERPL CALC-MCNC: 10 MG/DL
WBC # SPEC AUTO: 6.3 X10(3)/MCL (ref 4.5–11.5)
WBC #/AREA URNS AUTO: ABNORMAL /HPF

## 2022-05-13 PROCEDURE — 85610 PROTHROMBIN TIME: CPT

## 2022-05-13 PROCEDURE — 83036 HEMOGLOBIN GLYCOSYLATED A1C: CPT

## 2022-05-13 PROCEDURE — 81001 URINALYSIS AUTO W/SCOPE: CPT

## 2022-05-13 PROCEDURE — 80053 COMPREHEN METABOLIC PANEL: CPT

## 2022-05-13 PROCEDURE — 82306 VITAMIN D 25 HYDROXY: CPT

## 2022-05-13 PROCEDURE — 85027 COMPLETE CBC AUTOMATED: CPT

## 2022-05-13 PROCEDURE — 71046 X-RAY EXAM CHEST 2 VIEWS: CPT | Mod: TC

## 2022-05-13 PROCEDURE — 80061 LIPID PANEL: CPT

## 2022-05-13 PROCEDURE — 85730 THROMBOPLASTIN TIME PARTIAL: CPT

## 2022-05-13 PROCEDURE — 36415 COLL VENOUS BLD VENIPUNCTURE: CPT

## 2022-05-14 NOTE — OP NOTE
DATE OF SURGERY:    02/16/2022    SURGEON:  Christopher Arora M.D.    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    PROCEDURE:  Bone grafting of a nonunion.    IMPLANT TYPE:  Life[employer] ViviGen, expiration January 27, 2023, ID #[id number], code [id number].    APPROACH:  Deltopectoral.    COMPLICATIONS:  None.    INDICATION FOR PROCEDURE:  Ms. Weller is a 400-pound, 56-year-old female who was transferred here last year for stabilization of a humerus fracture.  We did intramedullary nailing with a locking Synthes maria e.  She had locked blade and additional screw proximally and 2 screws distally. She has failed to unite solidly and has had clinical pain at the fracture site.  A CT confirmed she had less than robust union, so bone grafting was indicated.  The risk of procedure was carefully explained, not excluding possible infection, bleeding, pain, scarring, neurovascular injury, possible need for future surgery.    DESCRIPTION OF PROCEDURE:  She was brought to the OR, given general anesthesia and I examined the humerus under fluoro dynamically to see if the maria e was stable and it was, there was no gross motion.  Options were discussed with her.  Anterior versus posterior approach for grafting. I felt safer approach would be deltopectoral.  She was placed on the Arthrex shoulder table, carefully secured.  Arm was prepped and draped.  Deltopectoral approach was made down to the pectoralis.  The distal deltoid was released.  Hohmann's were very carefully placed on bone bilaterally and using curved curettes, removed scar from the nonunion site.  Next, took a bur, decorticated significantly.  I then packed the nonunion site with the ViviGen graft which had thawed on the back table.  Following this, the permanents were taken.  Subcu was closed with 2-0, staples, sterile dressing applied.  No  complications.        [facility]   DD: 02/16/2022 1026   DT: 02/16/2022 1047  Job # [id number]

## 2022-05-16 DIAGNOSIS — Z13.820 SCREENING FOR OSTEOPOROSIS: Primary | ICD-10-CM

## 2022-05-23 DIAGNOSIS — M17.12 PRIMARY OSTEOARTHRITIS OF LEFT KNEE: Primary | ICD-10-CM

## 2022-05-24 ENCOUNTER — HOSPITAL ENCOUNTER (OUTPATIENT)
Dept: RADIOLOGY | Facility: HOSPITAL | Age: 57
Discharge: HOME | End: 2022-05-24
Attending: INTERNAL MEDICINE
Payer: COMMERCIAL

## 2022-05-24 DIAGNOSIS — Z13.820 SCREENING FOR OSTEOPOROSIS: ICD-10-CM

## 2022-05-24 PROCEDURE — 77080 DXA BONE DENSITY AXIAL: CPT | Mod: TC

## 2022-06-16 DIAGNOSIS — D25.9 UTERINE FIBROID: Primary | ICD-10-CM

## 2022-07-01 RX ORDER — DIAZEPAM 10 MG/1
TABLET ORAL
Status: ON HOLD | COMMUNITY
Start: 2022-02-09 | End: 2022-12-09 | Stop reason: HOSPADM

## 2022-07-01 RX ORDER — MEDROXYPROGESTERONE ACETATE 10 MG/1
10 TABLET ORAL 2 TIMES DAILY
Status: ON HOLD | COMMUNITY
Start: 2022-06-08 | End: 2022-12-09 | Stop reason: HOSPADM

## 2022-07-01 RX ORDER — LOSARTAN POTASSIUM AND HYDROCHLOROTHIAZIDE 12.5; 5 MG/1; MG/1
TABLET ORAL
COMMUNITY
Start: 2022-05-05

## 2022-07-01 RX ORDER — SOTALOL HYDROCHLORIDE 80 MG/1
TABLET ORAL
COMMUNITY
Start: 2022-05-05

## 2022-07-01 RX ORDER — FUROSEMIDE 40 MG/1
40 TABLET ORAL
COMMUNITY
Start: 2021-05-27

## 2022-07-01 RX ORDER — FERROUS SULFATE 325(65) MG
TABLET ORAL DAILY
Status: ON HOLD | COMMUNITY
Start: 2022-05-05 | End: 2022-12-09 | Stop reason: HOSPADM

## 2022-07-01 RX ORDER — DILTIAZEM HYDROCHLORIDE 300 MG/1
300 CAPSULE, COATED, EXTENDED RELEASE ORAL DAILY
COMMUNITY
Start: 2022-06-07

## 2022-07-01 RX ORDER — PITAVASTATIN CALCIUM 2.09 MG/1
TABLET, FILM COATED ORAL
COMMUNITY
Start: 2022-05-05

## 2022-07-01 RX ORDER — SEMAGLUTIDE 2.68 MG/ML
INJECTION, SOLUTION SUBCUTANEOUS
Status: ON HOLD | COMMUNITY
Start: 2022-05-20 | End: 2022-12-09 | Stop reason: HOSPADM

## 2022-07-06 ENCOUNTER — OFFICE VISIT (OUTPATIENT)
Dept: ORTHOPEDICS | Facility: CLINIC | Age: 57
End: 2022-07-06
Payer: OTHER MISCELLANEOUS

## 2022-07-06 VITALS
DIASTOLIC BLOOD PRESSURE: 86 MMHG | TEMPERATURE: 98 F | HEART RATE: 80 BPM | SYSTOLIC BLOOD PRESSURE: 138 MMHG | HEIGHT: 68 IN | BODY MASS INDEX: 44.41 KG/M2 | WEIGHT: 293 LBS

## 2022-07-06 DIAGNOSIS — M17.12 PRIMARY OSTEOARTHRITIS OF LEFT KNEE: Primary | ICD-10-CM

## 2022-07-06 PROCEDURE — 20610 DRAIN/INJ JOINT/BURSA W/O US: CPT | Mod: LT,,,

## 2022-07-06 PROCEDURE — 20610 LARGE JOINT ASPIRATION/INJECTION: L KNEE: ICD-10-PCS | Mod: LT,,,

## 2022-07-06 PROCEDURE — 99213 OFFICE O/P EST LOW 20 MIN: CPT | Mod: 25,,,

## 2022-07-06 PROCEDURE — 99213 PR OFFICE/OUTPT VISIT, EST, LEVL III, 20-29 MIN: ICD-10-PCS | Mod: 25,,,

## 2022-07-06 RX ORDER — DAPAGLIFLOZIN AND METFORMIN HYDROCHLORIDE 5; 1000 MG/1; MG/1
1 TABLET, FILM COATED, EXTENDED RELEASE ORAL DAILY
COMMUNITY
Start: 2022-05-19

## 2022-07-06 RX ORDER — LIDOCAINE HYDROCHLORIDE 20 MG/ML
2 INJECTION, SOLUTION INFILTRATION; PERINEURAL
Status: DISCONTINUED | OUTPATIENT
Start: 2022-07-06 | End: 2022-07-06 | Stop reason: HOSPADM

## 2022-07-06 RX ADMIN — LIDOCAINE HYDROCHLORIDE 2 ML: 20 INJECTION, SOLUTION INFILTRATION; PERINEURAL at 03:07

## 2022-07-06 NOTE — PROGRESS NOTES
Subjective:    CC: Pain of the Left Knee and Knee Pain (Left knee pain here for synvisc one injection Taking OTC not helping cortisone injection did help Home exercise)       HPI:  Patient presents to clinic for repeat evaluation of left knee.  Patient is just over 6 months from her last Synvisc injection of the left knee.  She would like to repeat  today as she continues to have pain with long standing, walking and bending.  She states her more recent steroid injection to give her some relief.  Currently taking over-the-counter pain medication needed.  Doing at home knee exercises.  She denies any new complaints.    ROS: Refer to HPI for pertinent ROS. All other 12 point systems negative.    Objective:    Vitals:    07/06/22 1604   BP: 138/86   Pulse: 80   Temp: 98.1 °F (36.7 °C)        Physical Exam:  The patient is well-nourished, well-developed and in no apparent distress, pleasant and cooperative. Examination of the left lower extremity compartments are soft and warm. Skin is intact. There are no signs or symptoms of DVT or infection. There is no joint effusion. There is no erythema. Tender to palpation along the anterior joint line , left knee range of motion is 5-115 degrees.  Patella grind is positive, Negative for apprehension. Neurovascularly intact distally.    Images:  Previous Images Reviewed and discussed with patient.    Assessment:  1. Primary osteoarthritis of left knee  - Large Joint Aspiration/Injection: L knee  - hylan g-f 20 (SYNVISC ONE) 48 mg/6 mL injection 48 mg  - LIDOcaine HCL 20 mg/ml (2%) injection 2 mL       Plan:  Physical exam and previous imaging findings discussed with patient.  Patient received a left knee Synvisc injection today.  Continue low-impact activities and OTC anti-inflammatories as needed with questions.  I advised the patient back in 3 months to assess her progress.    Follow up: Follow up in about 3 months (around 10/6/2022).    Large Joint Aspiration/Injection: L  knee    Date/Time: 7/6/2022 3:30 PM  Performed by: Mitzy Ansari PA-C  Authorized by: Mitzy Ansari PA-C     Consent Done?:  Yes (Verbal)  Indications:  Pain and arthritis  Site marked: the procedure site was marked    Timeout: prior to procedure the correct patient, procedure, and site was verified    Prep: patient was prepped and draped in usual sterile fashion      Local anesthesia used?: Yes    Local anesthetic:  Topical anesthetic and lidocaine 2% without epinephrine  Ultrasonic Guidance for needle placement?: No    Approach:  Anterolateral  Location:  Knee  Site:  L knee  Medications:  48 mg hylan g-f 20 48 mg/6 mL; 2 mL LIDOcaine HCL 20 mg/ml (2%) 20 mg/mL (2 %)  Patient tolerance:  Patient tolerated the procedure well with no immediate complications

## 2022-07-06 NOTE — PROCEDURES
Large Joint Aspiration/Injection: L knee    Date/Time: 7/6/2022 3:30 PM  Performed by: Mitzy Ansari PA-C  Authorized by: Mitzy Ansari PA-C     Consent Done?:  Yes (Verbal)  Indications:  Pain and arthritis  Site marked: the procedure site was marked    Timeout: prior to procedure the correct patient, procedure, and site was verified    Prep: patient was prepped and draped in usual sterile fashion      Local anesthesia used?: Yes    Local anesthetic:  Topical anesthetic and lidocaine 2% without epinephrine  Ultrasonic Guidance for needle placement?: No    Approach:  Anterolateral  Location:  Knee  Site:  L knee  Medications:  48 mg hylan g-f 20 48 mg/6 mL; 2 mL LIDOcaine HCL 20 mg/ml (2%) 20 mg/mL (2 %)  Patient tolerance:  Patient tolerated the procedure well with no immediate complications

## 2022-09-19 ENCOUNTER — LAB VISIT (OUTPATIENT)
Dept: LAB | Facility: HOSPITAL | Age: 57
End: 2022-09-19
Attending: OBSTETRICS & GYNECOLOGY
Payer: COMMERCIAL

## 2022-09-19 DIAGNOSIS — N85.2 UTERINE ENLARGEMENT: Primary | ICD-10-CM

## 2022-09-19 LAB
BASOPHILS # BLD AUTO: 0.04 X10(3)/MCL (ref 0–0.2)
BASOPHILS NFR BLD AUTO: 0.6 %
EOSINOPHIL # BLD AUTO: 0.12 X10(3)/MCL (ref 0–0.9)
EOSINOPHIL NFR BLD AUTO: 1.8 %
ERYTHROCYTE [DISTWIDTH] IN BLOOD BY AUTOMATED COUNT: 17.9 % (ref 11.5–17)
HCT VFR BLD AUTO: 47.6 % (ref 37–47)
HGB BLD-MCNC: 13.9 GM/DL (ref 12–16)
IMM GRANULOCYTES # BLD AUTO: 0.01 X10(3)/MCL (ref 0–0.04)
IMM GRANULOCYTES NFR BLD AUTO: 0.2 %
LYMPHOCYTES # BLD AUTO: 1.83 X10(3)/MCL (ref 0.6–4.6)
LYMPHOCYTES NFR BLD AUTO: 28.1 %
MCH RBC QN AUTO: 23.8 PG (ref 27–31)
MCHC RBC AUTO-ENTMCNC: 29.2 MG/DL (ref 33–36)
MCV RBC AUTO: 81.6 FL (ref 80–94)
MONOCYTES # BLD AUTO: 0.46 X10(3)/MCL (ref 0.1–1.3)
MONOCYTES NFR BLD AUTO: 7.1 %
NEUTROPHILS # BLD AUTO: 4.1 X10(3)/MCL (ref 2.1–9.2)
NEUTROPHILS NFR BLD AUTO: 62.2 %
PLATELET # BLD AUTO: 217 X10(3)/MCL (ref 130–400)
PMV BLD AUTO: 11.5 FL (ref 7.4–10.4)
RBC # BLD AUTO: 5.83 X10(6)/MCL (ref 4.2–5.4)
WBC # SPEC AUTO: 6.5 X10(3)/MCL (ref 4.5–11.5)

## 2022-09-19 PROCEDURE — 85025 COMPLETE CBC W/AUTO DIFF WBC: CPT

## 2022-09-19 PROCEDURE — 36415 COLL VENOUS BLD VENIPUNCTURE: CPT

## 2022-10-03 DIAGNOSIS — N85.2 ENLARGED UTERUS: Primary | ICD-10-CM

## 2022-10-05 ENCOUNTER — HOSPITAL ENCOUNTER (OUTPATIENT)
Dept: RADIOLOGY | Facility: HOSPITAL | Age: 57
Discharge: HOME | End: 2022-10-05
Attending: OBSTETRICS & GYNECOLOGY
Payer: COMMERCIAL

## 2022-10-05 DIAGNOSIS — N85.2 ENLARGED UTERUS: ICD-10-CM

## 2022-10-05 PROCEDURE — A9577 INJ MULTIHANCE: HCPCS

## 2022-10-05 PROCEDURE — 72197 MRI PELVIS W/O & W/DYE: CPT | Mod: TC

## 2022-10-05 PROCEDURE — 25500020 PHARM REV CODE 255

## 2022-10-05 RX ADMIN — GADOBENATE DIMEGLUMINE 20 ML: 529 INJECTION, SOLUTION INTRAVENOUS at 11:10

## 2022-10-12 ENCOUNTER — OFFICE VISIT (OUTPATIENT)
Dept: ORTHOPEDICS | Facility: CLINIC | Age: 57
End: 2022-10-12
Payer: OTHER MISCELLANEOUS

## 2022-10-12 VITALS — HEIGHT: 68 IN | BODY MASS INDEX: 44.41 KG/M2 | WEIGHT: 293 LBS

## 2022-10-12 DIAGNOSIS — M17.12 PRIMARY OSTEOARTHRITIS OF LEFT KNEE: Primary | ICD-10-CM

## 2022-10-12 PROCEDURE — 99213 PR OFFICE/OUTPT VISIT, EST, LEVL III, 20-29 MIN: ICD-10-PCS | Mod: ,,, | Performed by: ORTHOPAEDIC SURGERY

## 2022-10-12 PROCEDURE — 99213 OFFICE O/P EST LOW 20 MIN: CPT | Mod: ,,, | Performed by: ORTHOPAEDIC SURGERY

## 2022-10-12 NOTE — PROGRESS NOTES
Subjective:    CC: Pain of the Left Knee and Follow-up (synvisc one 7/6/22, pt states the gelsyn helped but didnt last long, ambulating with cane, pain level is a 4- taking tylenol prn, requesting cortisone today)       HPI:  Patient returns today for repeat exam.  Patient continues to have some pain about her left knee.  Patient states the gel injection did help, she is interested in repeating this when available, she is taking Tylenol.  She is ambulating with her cane.    ROS: Refer to HPI for pertinent ROS. All other 12 point systems negative.    Objective:    Physical Exam:  The patient is well-nourished, well-developed and in no apparent distress, pleasant and cooperative. Examination of the left lower extremity compartments are soft and warm. Skin is intact. There are no signs or symptoms of DVT or infection. There is no obvious joint effusion. There is no erythema. Tender to palpation along the anterior aspect medial joint line , left knee range of motion is 0-100. The knee is stable to exam with varus and valgus stressing. Negative anterior and posterior drawer. Negative Lachman´s.  Negative Szuanne's test. Patella grind is positive, Negative for apprehension. Neurovascularly intact distally.    Images: . Images Reviewed and discussed with patient.    Assessment:  1. Primary osteoarthritis of left knee  - Prior authorization Order        Plan:  At this time we discussed her physical exam and previous imaging findings.  She continues to improve with her left knee, we have discussed repeating her gel injection when available.  I would like see back in 3 months to repeat her gel injection.    Follow UP: No follow-ups on file.

## 2022-12-02 ENCOUNTER — LAB VISIT (OUTPATIENT)
Dept: LAB | Facility: HOSPITAL | Age: 57
End: 2022-12-02
Attending: STUDENT IN AN ORGANIZED HEALTH CARE EDUCATION/TRAINING PROGRAM
Payer: COMMERCIAL

## 2022-12-02 ENCOUNTER — ANESTHESIA EVENT (OUTPATIENT)
Dept: SURGERY | Facility: HOSPITAL | Age: 57
DRG: 742 | End: 2022-12-02
Payer: COMMERCIAL

## 2022-12-02 DIAGNOSIS — I48.91 A-FIB: ICD-10-CM

## 2022-12-02 DIAGNOSIS — N95.0 PMB (POSTMENOPAUSAL BLEEDING): Primary | ICD-10-CM

## 2022-12-02 DIAGNOSIS — E66.01 MORBID OBESITY: ICD-10-CM

## 2022-12-02 LAB
ABORH RETYPE: NORMAL
BASOPHILS # BLD AUTO: 0.03 X10(3)/MCL (ref 0–0.2)
BASOPHILS NFR BLD AUTO: 0.4 %
EOSINOPHIL # BLD AUTO: 0.11 X10(3)/MCL (ref 0–0.9)
EOSINOPHIL NFR BLD AUTO: 1.6 %
ERYTHROCYTE [DISTWIDTH] IN BLOOD BY AUTOMATED COUNT: 15.4 % (ref 11.5–17)
GROUP & RH: NORMAL
HCT VFR BLD AUTO: 44.9 % (ref 37–47)
HGB BLD-MCNC: 12.9 GM/DL (ref 12–16)
IMM GRANULOCYTES # BLD AUTO: 0.03 X10(3)/MCL (ref 0–0.04)
IMM GRANULOCYTES NFR BLD AUTO: 0.4 %
INDIRECT COOMBS GEL: NORMAL
LYMPHOCYTES # BLD AUTO: 1.87 X10(3)/MCL (ref 0.6–4.6)
LYMPHOCYTES NFR BLD AUTO: 28 %
MCH RBC QN AUTO: 25.2 PG (ref 27–31)
MCHC RBC AUTO-ENTMCNC: 28.7 MG/DL (ref 33–36)
MCV RBC AUTO: 87.7 FL (ref 80–94)
MONOCYTES # BLD AUTO: 0.45 X10(3)/MCL (ref 0.1–1.3)
MONOCYTES NFR BLD AUTO: 6.7 %
NEUTROPHILS # BLD AUTO: 4.2 X10(3)/MCL (ref 2.1–9.2)
NEUTROPHILS NFR BLD AUTO: 62.9 %
NRBC BLD AUTO-RTO: 0 %
PLATELET # BLD AUTO: 179 X10(3)/MCL (ref 130–400)
PMV BLD AUTO: 13 FL (ref 7.4–10.4)
RBC # BLD AUTO: 5.12 X10(6)/MCL (ref 4.2–5.4)
WBC # SPEC AUTO: 6.7 X10(3)/MCL (ref 4.5–11.5)

## 2022-12-02 PROCEDURE — 36415 COLL VENOUS BLD VENIPUNCTURE: CPT

## 2022-12-02 PROCEDURE — 86850 RBC ANTIBODY SCREEN: CPT | Performed by: STUDENT IN AN ORGANIZED HEALTH CARE EDUCATION/TRAINING PROGRAM

## 2022-12-02 PROCEDURE — 85025 COMPLETE CBC W/AUTO DIFF WBC: CPT

## 2022-12-07 ENCOUNTER — HOSPITAL ENCOUNTER (INPATIENT)
Facility: HOSPITAL | Age: 57
LOS: 3 days | Discharge: HOME | DRG: 742 | End: 2022-12-10
Attending: STUDENT IN AN ORGANIZED HEALTH CARE EDUCATION/TRAINING PROGRAM | Admitting: STUDENT IN AN ORGANIZED HEALTH CARE EDUCATION/TRAINING PROGRAM
Payer: COMMERCIAL

## 2022-12-07 ENCOUNTER — ANESTHESIA (OUTPATIENT)
Dept: SURGERY | Facility: HOSPITAL | Age: 57
DRG: 742 | End: 2022-12-07
Payer: COMMERCIAL

## 2022-12-07 DIAGNOSIS — Z90.710 S/P TAH-BSO: Primary | ICD-10-CM

## 2022-12-07 DIAGNOSIS — D25.1 INTRAMURAL LEIOMYOMA OF UTERUS: ICD-10-CM

## 2022-12-07 DIAGNOSIS — Z90.722 S/P TAH-BSO: Primary | ICD-10-CM

## 2022-12-07 DIAGNOSIS — N95.0 POSTMENOPAUSAL BLEEDING: ICD-10-CM

## 2022-12-07 DIAGNOSIS — Z90.79 S/P TAH-BSO: Primary | ICD-10-CM

## 2022-12-07 LAB
GROUP & RH: NORMAL
INDIRECT COOMBS GEL: NORMAL
POCT GLUCOSE: 98 MG/DL (ref 70–110)

## 2022-12-07 PROCEDURE — 71000016 HC POSTOP RECOV ADDL HR: Performed by: STUDENT IN AN ORGANIZED HEALTH CARE EDUCATION/TRAINING PROGRAM

## 2022-12-07 PROCEDURE — 25000003 PHARM REV CODE 250: Performed by: NURSE ANESTHETIST, CERTIFIED REGISTERED

## 2022-12-07 PROCEDURE — 37000009 HC ANESTHESIA EA ADD 15 MINS: Performed by: STUDENT IN AN ORGANIZED HEALTH CARE EDUCATION/TRAINING PROGRAM

## 2022-12-07 PROCEDURE — 63600175 PHARM REV CODE 636 W HCPCS: Performed by: STUDENT IN AN ORGANIZED HEALTH CARE EDUCATION/TRAINING PROGRAM

## 2022-12-07 PROCEDURE — 25000003 PHARM REV CODE 250: Performed by: ANESTHESIOLOGY

## 2022-12-07 PROCEDURE — 71000015 HC POSTOP RECOV 1ST HR: Performed by: STUDENT IN AN ORGANIZED HEALTH CARE EDUCATION/TRAINING PROGRAM

## 2022-12-07 PROCEDURE — 27201423 OPTIME MED/SURG SUP & DEVICES STERILE SUPPLY: Performed by: STUDENT IN AN ORGANIZED HEALTH CARE EDUCATION/TRAINING PROGRAM

## 2022-12-07 PROCEDURE — 63600175 PHARM REV CODE 636 W HCPCS

## 2022-12-07 PROCEDURE — 86850 RBC ANTIBODY SCREEN: CPT | Performed by: STUDENT IN AN ORGANIZED HEALTH CARE EDUCATION/TRAINING PROGRAM

## 2022-12-07 PROCEDURE — 71000039 HC RECOVERY, EACH ADD'L HOUR: Performed by: STUDENT IN AN ORGANIZED HEALTH CARE EDUCATION/TRAINING PROGRAM

## 2022-12-07 PROCEDURE — 63600175 PHARM REV CODE 636 W HCPCS: Performed by: NURSE ANESTHETIST, CERTIFIED REGISTERED

## 2022-12-07 PROCEDURE — 71000033 HC RECOVERY, INTIAL HOUR: Performed by: STUDENT IN AN ORGANIZED HEALTH CARE EDUCATION/TRAINING PROGRAM

## 2022-12-07 PROCEDURE — 11000001 HC ACUTE MED/SURG PRIVATE ROOM

## 2022-12-07 PROCEDURE — 36000708 HC OR TIME LEV III 1ST 15 MIN: Performed by: STUDENT IN AN ORGANIZED HEALTH CARE EDUCATION/TRAINING PROGRAM

## 2022-12-07 PROCEDURE — 37000008 HC ANESTHESIA 1ST 15 MINUTES: Performed by: STUDENT IN AN ORGANIZED HEALTH CARE EDUCATION/TRAINING PROGRAM

## 2022-12-07 PROCEDURE — 63600175 PHARM REV CODE 636 W HCPCS: Performed by: ANESTHESIOLOGY

## 2022-12-07 PROCEDURE — 36000709 HC OR TIME LEV III EA ADD 15 MIN: Performed by: STUDENT IN AN ORGANIZED HEALTH CARE EDUCATION/TRAINING PROGRAM

## 2022-12-07 PROCEDURE — 36415 COLL VENOUS BLD VENIPUNCTURE: CPT | Performed by: STUDENT IN AN ORGANIZED HEALTH CARE EDUCATION/TRAINING PROGRAM

## 2022-12-07 RX ORDER — ONDANSETRON 2 MG/ML
4 INJECTION INTRAMUSCULAR; INTRAVENOUS
Status: COMPLETED | OUTPATIENT
Start: 2022-12-07 | End: 2022-12-07

## 2022-12-07 RX ORDER — DIPHENHYDRAMINE HCL 25 MG
25 CAPSULE ORAL EVERY 4 HOURS PRN
Status: DISCONTINUED | OUTPATIENT
Start: 2022-12-07 | End: 2022-12-10 | Stop reason: HOSPADM

## 2022-12-07 RX ORDER — LIDOCAINE HYDROCHLORIDE 10 MG/ML
1 INJECTION, SOLUTION EPIDURAL; INFILTRATION; INTRACAUDAL; PERINEURAL ONCE
Status: DISCONTINUED | OUTPATIENT
Start: 2022-12-07 | End: 2022-12-10 | Stop reason: HOSPADM

## 2022-12-07 RX ORDER — MEPERIDINE HYDROCHLORIDE 25 MG/ML
INJECTION INTRAMUSCULAR; INTRAVENOUS; SUBCUTANEOUS
Status: COMPLETED
Start: 2022-12-07 | End: 2022-12-07

## 2022-12-07 RX ORDER — CEFAZOLIN SODIUM 1 G/3ML
INJECTION, POWDER, FOR SOLUTION INTRAMUSCULAR; INTRAVENOUS
Status: DISPENSED
Start: 2022-12-07 | End: 2022-12-07

## 2022-12-07 RX ORDER — PROMETHAZINE HYDROCHLORIDE 25 MG/ML
INJECTION, SOLUTION INTRAMUSCULAR; INTRAVENOUS
Status: COMPLETED
Start: 2022-12-07 | End: 2022-12-07

## 2022-12-07 RX ORDER — PROPOFOL 10 MG/ML
VIAL (ML) INTRAVENOUS
Status: DISCONTINUED | OUTPATIENT
Start: 2022-12-07 | End: 2022-12-07

## 2022-12-07 RX ORDER — HYDROMORPHONE HYDROCHLORIDE 2 MG/ML
2 INJECTION, SOLUTION INTRAMUSCULAR; INTRAVENOUS; SUBCUTANEOUS
Status: DISCONTINUED | OUTPATIENT
Start: 2022-12-07 | End: 2022-12-10 | Stop reason: HOSPADM

## 2022-12-07 RX ORDER — GABAPENTIN 300 MG/1
600 CAPSULE ORAL
Status: COMPLETED | OUTPATIENT
Start: 2022-12-07 | End: 2022-12-07

## 2022-12-07 RX ORDER — CELECOXIB 200 MG/1
200 CAPSULE ORAL
Status: COMPLETED | OUTPATIENT
Start: 2022-12-07 | End: 2022-12-07

## 2022-12-07 RX ORDER — DIPHENHYDRAMINE HYDROCHLORIDE 50 MG/ML
25 INJECTION INTRAMUSCULAR; INTRAVENOUS EVERY 4 HOURS PRN
Status: DISCONTINUED | OUTPATIENT
Start: 2022-12-07 | End: 2022-12-10 | Stop reason: HOSPADM

## 2022-12-07 RX ORDER — ONDANSETRON 2 MG/ML
4 INJECTION INTRAMUSCULAR; INTRAVENOUS ONCE AS NEEDED
Status: DISCONTINUED | OUTPATIENT
Start: 2022-12-07 | End: 2022-12-10 | Stop reason: HOSPADM

## 2022-12-07 RX ORDER — ONDANSETRON 2 MG/ML
INJECTION INTRAMUSCULAR; INTRAVENOUS
Status: DISCONTINUED | OUTPATIENT
Start: 2022-12-07 | End: 2022-12-07

## 2022-12-07 RX ORDER — MIDAZOLAM HYDROCHLORIDE 1 MG/ML
INJECTION INTRAMUSCULAR; INTRAVENOUS
Status: DISCONTINUED | OUTPATIENT
Start: 2022-12-07 | End: 2022-12-07

## 2022-12-07 RX ORDER — ROCURONIUM BROMIDE 10 MG/ML
INJECTION, SOLUTION INTRAVENOUS
Status: DISCONTINUED | OUTPATIENT
Start: 2022-12-07 | End: 2022-12-07

## 2022-12-07 RX ORDER — MORPHINE SULFATE 10 MG/ML
4 INJECTION INTRAMUSCULAR; INTRAVENOUS; SUBCUTANEOUS
Status: DISCONTINUED | OUTPATIENT
Start: 2022-12-07 | End: 2022-12-10 | Stop reason: HOSPADM

## 2022-12-07 RX ORDER — KETOROLAC TROMETHAMINE 30 MG/ML
30 INJECTION, SOLUTION INTRAMUSCULAR; INTRAVENOUS ONCE
Status: COMPLETED | OUTPATIENT
Start: 2022-12-07 | End: 2022-12-07

## 2022-12-07 RX ORDER — ACETAMINOPHEN 10 MG/ML
INJECTION, SOLUTION INTRAVENOUS
Status: COMPLETED
Start: 2022-12-07 | End: 2022-12-07

## 2022-12-07 RX ORDER — ENOXAPARIN SODIUM 100 MG/ML
40 INJECTION SUBCUTANEOUS EVERY 12 HOURS
Status: DISCONTINUED | OUTPATIENT
Start: 2022-12-07 | End: 2022-12-08

## 2022-12-07 RX ORDER — ACETAMINOPHEN 10 MG/ML
1000 INJECTION, SOLUTION INTRAVENOUS ONCE
Status: COMPLETED | OUTPATIENT
Start: 2022-12-07 | End: 2022-12-07

## 2022-12-07 RX ORDER — MEPERIDINE HYDROCHLORIDE 25 MG/ML
12.5 INJECTION INTRAMUSCULAR; INTRAVENOUS; SUBCUTANEOUS EVERY 10 MIN PRN
Status: COMPLETED | OUTPATIENT
Start: 2022-12-07 | End: 2022-12-07

## 2022-12-07 RX ORDER — PROCHLORPERAZINE EDISYLATE 5 MG/ML
5 INJECTION INTRAMUSCULAR; INTRAVENOUS EVERY 6 HOURS PRN
Status: DISCONTINUED | OUTPATIENT
Start: 2022-12-07 | End: 2022-12-10 | Stop reason: HOSPADM

## 2022-12-07 RX ORDER — SODIUM CHLORIDE, SODIUM LACTATE, POTASSIUM CHLORIDE, CALCIUM CHLORIDE 600; 310; 30; 20 MG/100ML; MG/100ML; MG/100ML; MG/100ML
INJECTION, SOLUTION INTRAVENOUS CONTINUOUS
Status: DISCONTINUED | OUTPATIENT
Start: 2022-12-07 | End: 2022-12-10 | Stop reason: HOSPADM

## 2022-12-07 RX ORDER — CEFAZOLIN SODIUM 2 G/50ML
SOLUTION INTRAVENOUS
Status: DISCONTINUED
Start: 2022-12-07 | End: 2022-12-07 | Stop reason: ALTCHOICE

## 2022-12-07 RX ORDER — FENTANYL CITRATE 50 UG/ML
INJECTION, SOLUTION INTRAMUSCULAR; INTRAVENOUS
Status: DISCONTINUED | OUTPATIENT
Start: 2022-12-07 | End: 2022-12-07

## 2022-12-07 RX ORDER — METHOCARBAMOL 100 MG/ML
1000 INJECTION, SOLUTION INTRAMUSCULAR; INTRAVENOUS ONCE
Status: COMPLETED | OUTPATIENT
Start: 2022-12-07 | End: 2022-12-07

## 2022-12-07 RX ORDER — ACETAMINOPHEN 10 MG/ML
1000 INJECTION, SOLUTION INTRAVENOUS ONCE
Status: COMPLETED | OUTPATIENT
Start: 2022-12-08 | End: 2022-12-08

## 2022-12-07 RX ORDER — MUPIROCIN 20 MG/G
OINTMENT TOPICAL 2 TIMES DAILY
Status: DISCONTINUED | OUTPATIENT
Start: 2022-12-07 | End: 2022-12-10 | Stop reason: HOSPADM

## 2022-12-07 RX ORDER — DEXMEDETOMIDINE HYDROCHLORIDE 100 UG/ML
INJECTION, SOLUTION INTRAVENOUS
Status: DISCONTINUED | OUTPATIENT
Start: 2022-12-07 | End: 2022-12-07

## 2022-12-07 RX ORDER — HYDROMORPHONE HYDROCHLORIDE 2 MG/ML
0.5 INJECTION, SOLUTION INTRAMUSCULAR; INTRAVENOUS; SUBCUTANEOUS EVERY 5 MIN PRN
Status: DISCONTINUED | OUTPATIENT
Start: 2022-12-07 | End: 2022-12-10 | Stop reason: HOSPADM

## 2022-12-07 RX ORDER — DEXAMETHASONE SODIUM PHOSPHATE 4 MG/ML
INJECTION, SOLUTION INTRA-ARTICULAR; INTRALESIONAL; INTRAMUSCULAR; INTRAVENOUS; SOFT TISSUE
Status: DISCONTINUED | OUTPATIENT
Start: 2022-12-07 | End: 2022-12-07

## 2022-12-07 RX ORDER — OXYCODONE AND ACETAMINOPHEN 5; 325 MG/1; MG/1
1 TABLET ORAL EVERY 4 HOURS PRN
Status: DISCONTINUED | OUTPATIENT
Start: 2022-12-07 | End: 2022-12-10 | Stop reason: HOSPADM

## 2022-12-07 RX ORDER — CEFAZOLIN SODIUM 1 G/3ML
INJECTION, POWDER, FOR SOLUTION INTRAMUSCULAR; INTRAVENOUS
Status: DISCONTINUED | OUTPATIENT
Start: 2022-12-07 | End: 2022-12-07

## 2022-12-07 RX ORDER — ONDANSETRON 4 MG/1
8 TABLET, ORALLY DISINTEGRATING ORAL EVERY 8 HOURS PRN
Status: DISCONTINUED | OUTPATIENT
Start: 2022-12-07 | End: 2022-12-10 | Stop reason: HOSPADM

## 2022-12-07 RX ORDER — ACETAMINOPHEN 500 MG
1000 TABLET ORAL
Status: COMPLETED | OUTPATIENT
Start: 2022-12-07 | End: 2022-12-07

## 2022-12-07 RX ORDER — PROCHLORPERAZINE EDISYLATE 5 MG/ML
5 INJECTION INTRAMUSCULAR; INTRAVENOUS EVERY 30 MIN PRN
Status: DISCONTINUED | OUTPATIENT
Start: 2022-12-07 | End: 2022-12-10 | Stop reason: HOSPADM

## 2022-12-07 RX ORDER — PROMETHAZINE HYDROCHLORIDE 25 MG/ML
12.5 INJECTION, SOLUTION INTRAMUSCULAR; INTRAVENOUS ONCE AS NEEDED
Status: COMPLETED | OUTPATIENT
Start: 2022-12-07 | End: 2022-12-07

## 2022-12-07 RX ORDER — METHOCARBAMOL 100 MG/ML
INJECTION, SOLUTION INTRAMUSCULAR; INTRAVENOUS
Status: COMPLETED
Start: 2022-12-07 | End: 2022-12-07

## 2022-12-07 RX ORDER — BISACODYL 10 MG
10 SUPPOSITORY, RECTAL RECTAL DAILY PRN
Status: DISCONTINUED | OUTPATIENT
Start: 2022-12-07 | End: 2022-12-10 | Stop reason: HOSPADM

## 2022-12-07 RX ADMIN — METHOCARBAMOL 1000 MG: 100 INJECTION, SOLUTION INTRAMUSCULAR; INTRAVENOUS at 12:12

## 2022-12-07 RX ADMIN — DEXMEDETOMIDINE 5 MCG: 200 INJECTION, SOLUTION INTRAVENOUS at 11:12

## 2022-12-07 RX ADMIN — DEXMEDETOMIDINE 10 MCG: 200 INJECTION, SOLUTION INTRAVENOUS at 10:12

## 2022-12-07 RX ADMIN — MEPERIDINE HYDROCHLORIDE 25 MG: 25 INJECTION INTRAMUSCULAR; INTRAVENOUS; SUBCUTANEOUS at 01:12

## 2022-12-07 RX ADMIN — MEPERIDINE HYDROCHLORIDE 12.5 MG: 25 INJECTION INTRAMUSCULAR; INTRAVENOUS; SUBCUTANEOUS at 02:12

## 2022-12-07 RX ADMIN — ROCURONIUM BROMIDE 50 MG: 10 INJECTION, SOLUTION INTRAVENOUS at 10:12

## 2022-12-07 RX ADMIN — DEXMEDETOMIDINE 5 MCG: 200 INJECTION, SOLUTION INTRAVENOUS at 10:12

## 2022-12-07 RX ADMIN — FENTANYL CITRATE 100 MCG: 50 INJECTION, SOLUTION INTRAMUSCULAR; INTRAVENOUS at 11:12

## 2022-12-07 RX ADMIN — ONDANSETRON 4 MG: 2 INJECTION INTRAMUSCULAR; INTRAVENOUS at 09:12

## 2022-12-07 RX ADMIN — GABAPENTIN 600 MG: 300 CAPSULE ORAL at 08:12

## 2022-12-07 RX ADMIN — ENOXAPARIN SODIUM 40 MG: 40 INJECTION SUBCUTANEOUS at 09:12

## 2022-12-07 RX ADMIN — PROPOFOL 200 MG: 10 INJECTION, EMULSION INTRAVENOUS at 10:12

## 2022-12-07 RX ADMIN — ACETAMINOPHEN 1000 MG: 10 INJECTION, SOLUTION INTRAVENOUS at 01:12

## 2022-12-07 RX ADMIN — PROMETHAZINE HYDROCHLORIDE 12.5 MG: 25 INJECTION INTRAMUSCULAR; INTRAVENOUS at 01:12

## 2022-12-07 RX ADMIN — FENTANYL CITRATE 100 MCG: 50 INJECTION, SOLUTION INTRAMUSCULAR; INTRAVENOUS at 10:12

## 2022-12-07 RX ADMIN — CEFAZOLIN 3 G: 330 INJECTION, POWDER, FOR SOLUTION INTRAMUSCULAR; INTRAVENOUS at 10:12

## 2022-12-07 RX ADMIN — ACETAMINOPHEN 1000 MG: 500 TABLET ORAL at 08:12

## 2022-12-07 RX ADMIN — MIDAZOLAM HYDROCHLORIDE 2 MG: 1 INJECTION, SOLUTION INTRAMUSCULAR; INTRAVENOUS at 09:12

## 2022-12-07 RX ADMIN — ONDANSETRON 4 MG: 2 INJECTION INTRAMUSCULAR; INTRAVENOUS at 11:12

## 2022-12-07 RX ADMIN — PROMETHAZINE HYDROCHLORIDE 12.5 MG: 25 INJECTION, SOLUTION INTRAMUSCULAR; INTRAVENOUS at 01:12

## 2022-12-07 RX ADMIN — MORPHINE SULFATE 4 MG: 10 INJECTION INTRAVENOUS at 04:12

## 2022-12-07 RX ADMIN — CELECOXIB 200 MG: 200 CAPSULE ORAL at 08:12

## 2022-12-07 RX ADMIN — DEXAMETHASONE SODIUM PHOSPHATE 4 MG: 4 INJECTION, SOLUTION INTRA-ARTICULAR; INTRALESIONAL; INTRAMUSCULAR; INTRAVENOUS; SOFT TISSUE at 10:12

## 2022-12-07 RX ADMIN — SUGAMMADEX 200 MG: 100 INJECTION, SOLUTION INTRAVENOUS at 11:12

## 2022-12-07 RX ADMIN — HYDROMORPHONE HYDROCHLORIDE 0.5 MG: 2 INJECTION INTRAMUSCULAR; INTRAVENOUS; SUBCUTANEOUS at 03:12

## 2022-12-07 RX ADMIN — HYDROMORPHONE HYDROCHLORIDE 2 MG: 2 INJECTION INTRAMUSCULAR; INTRAVENOUS; SUBCUTANEOUS at 09:12

## 2022-12-07 RX ADMIN — DIPHENHYDRAMINE HYDROCHLORIDE 25 MG: 50 INJECTION INTRAMUSCULAR; INTRAVENOUS at 01:12

## 2022-12-07 RX ADMIN — KETOROLAC TROMETHAMINE 30 MG: 30 INJECTION, SOLUTION INTRAMUSCULAR at 12:12

## 2022-12-07 RX ADMIN — ROCURONIUM BROMIDE 30 MG: 10 INJECTION, SOLUTION INTRAVENOUS at 10:12

## 2022-12-07 RX ADMIN — HYDROMORPHONE HYDROCHLORIDE 2 MG: 2 INJECTION INTRAMUSCULAR; INTRAVENOUS; SUBCUTANEOUS at 12:12

## 2022-12-07 RX ADMIN — SODIUM CHLORIDE, SODIUM GLUCONATE, SODIUM ACETATE, POTASSIUM CHLORIDE AND MAGNESIUM CHLORIDE: 526; 502; 368; 37; 30 INJECTION, SOLUTION INTRAVENOUS at 09:12

## 2022-12-07 NOTE — BRIEF OP NOTE
[facility] - Periop Services  Surgery Department  Operative Note    SUMMARY     Date of Procedure: 12/7/2022     Procedure: Procedure(s) (LRB):  HYSTERECTOMY, TOTAL, ABDOMINAL, WITH BILATERAL SALPINGO-OOPHORECTOMY (N/A)     Surgeon(s) and Role:     * Rancho Montes MD - Primary     Enlarged uterus.     Assisting Surgeon: Ernst montes    Pre-Operative Diagnosis: Intramural leiomyoma of uterus [D25.1]  Postmenopausal bleeding [N95.0] morbid obesity    Post-Operative Diagnosis: Post-Op Diagnosis Codes:     * Intramural leiomyoma of uterus [D25.1]     * Postmenopausal bleeding [N95.0]  Morbid obesity    Anesthesia: General    Operative Findings (including complications, if any): enlarged uterus.  Normal tubs and ovaries        Estimated Blood Loss (EBL): less than 100cc             Specimens:   Specimen (24h ago, onward)       Start     Ordered    12/07/22 1131  Specimen to Pathology  RELEASE UPON ORDERING        References:    Click here for ordering Quick Tip   Question:  Release to patient  Answer:  Immediate    12/07/22 1131                            Condition: Good    Disposition: PACU - hemodynamically stable.    Attestation: I performed the procedure.

## 2022-12-07 NOTE — ANESTHESIA PREPROCEDURE EVALUATION
"                                                                                                             12/06/2022  Poppy Weller is a 57 y.o., female , who presents for the following:    Procedure: HYSTERECTOMY, TOTAL, ABDOMINAL, WITH BILATERAL SALPINGO-OOPHORECTOMY (Abdomen) - ABNER / BSO //  (NO ROBOT)   Anesthesia type: General   Diagnosis:        Intramural leiomyoma of uterus [D25.1]       Postmenopausal bleeding [N95.0]   Pre-op diagnosis:        Intramural leiomyoma of uterus [D25.1]       Postmenopausal bleeding [N95.0]   Location: [facility] OR 06 / [facility] OR   Surgeons: Rancho Easley MD         Pre-op Assessment    I have reviewed the Patient Summary Reports.     I have reviewed the Nursing Notes. I have reviewed the NPO Status.   I have reviewed the Medications.     Review of Systems  Anesthesia Hx:  No problems with previous Anesthesia  Denies Family Hx of Anesthesia complications.   Denies Personal Hx of Anesthesia complications.   Social:  Non-Smoker    Cardiovascular:   Hypertension Dysrhythmias atrial fibrillation hyperlipidemia A. Fib, s/p ECV  Pericarditis Hx   Pulmonary:   Sleep Apnea, CPAP    Hepatic/GI:  Hepatic/GI Normal    Endocrine:   Diabetes "pre-Diabetes" Morbid Obesity / BMI > 40      Physical Exam  General: Alert and Oriented  Super Morbid Obesity  Airway:  Mallampati: II   Mouth Opening: Normal  TM Distance: Normal  Tongue: Large  Neck ROM: Normal ROM    Dental:  Intact    Chest/Lungs:  Normal Respiratory Rate    Heart:  Rate: Normal  Rhythm: Regular Rhythm       12/02/22 11:41   Group & Rh O POS   Indirect Varinder GEL NEG      Latest Reference Range & Units 12/02/22 11:41   WBC 4.5 - 11.5 x10(3)/mcL 6.7   RBC 4.20 - 5.40 x10(6)/mcL 5.12   HEMOGLOBIN 12.0 - 16.0 gm/dL 12.9   HEMATOCRIT 37.0 - 47.0 % 44.9   MCV 80.0 - 94.0 fL 87.7   MCH 27.0 - 31.0 pg 25.2 (L)   MCHC 33.0 - 36.0 mg/dL 28.7 (L)   RDW 11.5 - 17.0 % 15.4   Platelets 130 - 400 x10(3)/mcL 179   (L): Data is " abnormally low   Latest Reference Range & Units 05/13/22 11:48   Sodium 136 - 145 mmol/L 143   Potassium 3.5 - 5.1 mmol/L 4.2   Chloride 98 - 107 mmol/L 104   CO2 22 - 29 mmol/L 29   BUN 9.8 - 20.1 mg/dL 12.0   Creatinine 0.55 - 1.02 mg/dL 0.70   eGFR if African American mls/min/1.73/m2 >60   Glucose 74 - 100 mg/dL 124 (H)   (H): Data is abnormally high    TTE:        Anesthesia Plan  Type of Anesthesia, risks & benefits discussed:    Anesthesia Type: Gen ETT  Intra-op Monitoring Plan: Standard ASA Monitors  Post Op Pain Control Plan: IV/PO Opioids PRN and multimodal analgesia  Induction:  IV  Airway Plan: Direct, Post-Induction  Informed Consent: Informed consent signed with the Patient and all parties understand the risks and agree with anesthesia plan.  All questions answered. Patient consented to blood products? Yes  ASA Score: 3  Day of Surgery Review of History & Physical: H&P Update referred to the surgeon/provider.H&P completed by Anesthesiologist.  Anesthesia Plan Notes: ERAS / Multiple Antiemetics    Ready For Surgery From Anesthesia Perspective.     .

## 2022-12-07 NOTE — ANESTHESIA POSTPROCEDURE EVALUATION
Anesthesia Post Evaluation    Patient: Poppy Weller    Procedure(s) Performed: Procedure(s) (LRB):  HYSTERECTOMY, TOTAL, ABDOMINAL, WITH BILATERAL SALPINGO-OOPHORECTOMY (N/A)    Final Anesthesia Type: general      Patient location during evaluation: PACU  Patient participation: Yes- Able to Participate  Level of consciousness: sedated  Post-procedure vital signs: reviewed and stable  Pain management: adequate  Airway patency: patent    PONV status at discharge: No PONV  Anesthetic complications: no      Cardiovascular status: blood pressure returned to baseline, stable and hemodynamically stable  Respiratory status: spontaneous ventilation, unassisted and nasal cannula  Hydration status: euvolemic  Follow-up not needed.  Comments: [facility]          Vitals Value Taken Time   /99 12/07/22 1341   Temp 37.1 12/07/22 1342   Pulse 64 12/07/22 1341   Resp 13 12/07/22 1341   SpO2 96 % 12/07/22 1341   Vitals shown include unvalidated device data.      No case tracking events are documented in the log.      Pain/Harini Score: Pain Rating Prior to Med Admin: 8 (12/7/2022  1:30 PM)  Harini Score: 9 (12/7/2022 12:30 PM)

## 2022-12-07 NOTE — INTERVAL H&P NOTE
The patient has been examined and the H&P has been reviewed:    I concur with the findings and no changes have occurred since H&P was written.    Surgery risks, benefits and alternative options discussed and understood by patient/family.    Medium risk for surgery.  Cardiac clearance done.  GYN ONC has tried more conservative approaches with no success. Will proceed with ABNER/BSO

## 2022-12-07 NOTE — TRANSFER OF CARE
"Anesthesia Transfer of Care Note    Patient: Poppy Weller    Procedure(s) Performed: Procedure(s) (LRB):  HYSTERECTOMY, TOTAL, ABDOMINAL, WITH BILATERAL SALPINGO-OOPHORECTOMY (N/A)    Patient location: PACU    Anesthesia Type: general    Transport from OR: Transported from OR on room air with adequate spontaneous ventilation    Post pain: adequate analgesia    Post assessment: no apparent anesthetic complications and tolerated procedure well    Post vital signs: stable    Level of consciousness: awake, alert and oriented    Nausea/Vomiting: no nausea/vomiting    Complications: none    Transfer of care protocol was followed      Last vitals:   Visit Vitals  /84   Pulse 83   Temp 37.1 °C (98.7 °F) (Oral)   Resp 18   Ht 5' 9" (1.753 m)   Wt (!) 179.1 kg (394 lb 13.5 oz)   LMP 04/01/2022 (Approximate)   SpO2 96%   Breastfeeding No   BMI 58.31 kg/m²     "

## 2022-12-07 NOTE — ANESTHESIA PROCEDURE NOTES
Intubation    Date/Time: 12/7/2022 10:02 AM  Performed by: Jorge L Chawla CRNA  Authorized by: Naun Caballero MD     Intubation:     Induction:  Intravenous    Intubated:  Postinduction    Mask Ventilation:  Easy mask    Attempts:  1    Attempted By:  CRNA    Method of Intubation:  Direct    Blade:  Parham 2    Laryngeal View Grade: Grade I - full view of cords      Difficult Airway Encountered?: No      Complications:  None    Airway Device:  Oral endotracheal tube    Airway Device Size:  7.0    Style/Cuff Inflation:  Cuffed (inflated to minimal occlusive pressure)    Inflation Amount (mL):  9    Tube secured:  22    Secured at:  The lips    Placement Verified By:  Capnometry    Complicating Factors:  None and obesity    Findings Post-Intubation:  BS equal bilateral and atraumatic/condition of teeth unchanged  Notes:      Ramped patient and had great g1 view

## 2022-12-08 PROCEDURE — 63600175 PHARM REV CODE 636 W HCPCS: Performed by: STUDENT IN AN ORGANIZED HEALTH CARE EDUCATION/TRAINING PROGRAM

## 2022-12-08 PROCEDURE — 25000003 PHARM REV CODE 250: Performed by: STUDENT IN AN ORGANIZED HEALTH CARE EDUCATION/TRAINING PROGRAM

## 2022-12-08 PROCEDURE — 11000001 HC ACUTE MED/SURG PRIVATE ROOM

## 2022-12-08 RX ORDER — POLYETHYLENE GLYCOL 3350 17 G/17G
17 POWDER, FOR SOLUTION ORAL DAILY
Status: DISCONTINUED | OUTPATIENT
Start: 2022-12-08 | End: 2022-12-10 | Stop reason: HOSPADM

## 2022-12-08 RX ORDER — IBUPROFEN 400 MG/1
400 TABLET ORAL EVERY 6 HOURS PRN
Status: DISCONTINUED | OUTPATIENT
Start: 2022-12-08 | End: 2022-12-10 | Stop reason: HOSPADM

## 2022-12-08 RX ORDER — OXYCODONE AND ACETAMINOPHEN 10; 325 MG/1; MG/1
1 TABLET ORAL EVERY 4 HOURS PRN
Status: DISCONTINUED | OUTPATIENT
Start: 2022-12-08 | End: 2022-12-10 | Stop reason: HOSPADM

## 2022-12-08 RX ORDER — DOCUSATE SODIUM 100 MG/1
200 CAPSULE, LIQUID FILLED ORAL 2 TIMES DAILY
Status: DISCONTINUED | OUTPATIENT
Start: 2022-12-08 | End: 2022-12-10 | Stop reason: HOSPADM

## 2022-12-08 RX ADMIN — POLYETHYLENE GLYCOL 3350 17 G: 17 POWDER, FOR SOLUTION ORAL at 12:12

## 2022-12-08 RX ADMIN — OXYCODONE AND ACETAMINOPHEN 1 TABLET: 10; 325 TABLET ORAL at 04:12

## 2022-12-08 RX ADMIN — OXYCODONE AND ACETAMINOPHEN 1 TABLET: 10; 325 TABLET ORAL at 08:12

## 2022-12-08 RX ADMIN — DOCUSATE SODIUM 200 MG: 100 CAPSULE, LIQUID FILLED ORAL at 10:12

## 2022-12-08 RX ADMIN — HYDROMORPHONE HYDROCHLORIDE 2 MG: 2 INJECTION INTRAMUSCULAR; INTRAVENOUS; SUBCUTANEOUS at 01:12

## 2022-12-08 RX ADMIN — ENOXAPARIN SODIUM 40 MG: 40 INJECTION SUBCUTANEOUS at 08:12

## 2022-12-08 RX ADMIN — IBUPROFEN 400 MG: 400 TABLET, FILM COATED ORAL at 02:12

## 2022-12-08 RX ADMIN — HYDROMORPHONE HYDROCHLORIDE 2 MG: 2 INJECTION INTRAMUSCULAR; INTRAVENOUS; SUBCUTANEOUS at 08:12

## 2022-12-08 RX ADMIN — ACETAMINOPHEN 1000 MG: 10 INJECTION, SOLUTION INTRAVENOUS at 12:12

## 2022-12-08 RX ADMIN — IBUPROFEN 400 MG: 400 TABLET, FILM COATED ORAL at 10:12

## 2022-12-08 RX ADMIN — HYDROMORPHONE HYDROCHLORIDE 2 MG: 2 INJECTION INTRAMUSCULAR; INTRAVENOUS; SUBCUTANEOUS at 05:12

## 2022-12-08 RX ADMIN — OXYCODONE AND ACETAMINOPHEN 1 TABLET: 10; 325 TABLET ORAL at 12:12

## 2022-12-08 RX ADMIN — ONDANSETRON 8 MG: 4 TABLET, ORALLY DISINTEGRATING ORAL at 06:12

## 2022-12-08 NOTE — OP NOTE
[facility]                       [address] [place] [id number]    PATIENT NAME:      POPPY WELLER  YOB: 1965  CSN:               [id number]  MRN:               [id number]  ADMIT DATE:        12/07/2022 06:56:00  PHYSICIAN:         Rancho Easley MD                          OPERATIVE REPORT      DATE OF SURGERY:    12/07/2022 00:00:00    SURGEON:  Rancho Easley MD    PREOPERATIVE DIAGNOSES:    1. Persistent postmenopausal bleeding.  2. Enlarged fibroid uterus.    3. Atrial fibrillation, on chronic anticoagulation.    POSTOPERATIVE DIAGNOSES:    1. Persistent postmenopausal bleeding.  2. Enlarged fibroid uterus.    3. Atrial fibrillation, on chronic anticoagulation.    ASSISTANT SURGEON:  Ernst Easley MD.    PROCEDURES:    1. Total abdominal hysterectomy.   2. Bilateral salpingo oophorectomy.    COMPLICATIONS:   None.    BLOOD LOSS:  Less than 100 cc.    SPECIMENS:  Uterus, cervix, fallopian tubes, and ovaries.    INDICATIONS FOR PROCEDURE:  Poppy is a 57-year-old female whom I have been   seeing since May of 2022.  She presented with heavy bleeding.  She is on   anticoagulation for atrial fib ever since she had a cardiac window for what   sounds like a pericarditis.  She was initially referred to GYN Oncology due to   her BMI of 60 and medical comorbidities.  D and C was performed which was   benign, and they also did a uterine artery embolization which was successful for   a temporary amount of time but Ms Weller continued to bleed, and she showed me   in my office multiple pictures of saturated pads from this bleeding.  It was   affecting her life and she wanted definitive surgical management.  I discussed   the case with the GYN oncologist and he felt that this was the right decision as   well to proceed with definitive surgery, even given her risks of surgery.    Discussion was had about  potentially attempting a MyoSure but after discussion   with the patient, she did not want to try any more conservative therapy.  She   accepted the risks of surgery and wanted to proceed.      I did discuss with Cardiology before proceeding with surgery.  He felt like she   was low to medium risk as far as cardiac conditions go.  The patient was   counseled on the risks such as but not limited to bleeding, infection, wound   dehiscence, and the benefits being cessation of her continuous bleeding.    Another risk would be DVT.    PROCEDURE IN DETAIL:  Patient was taken the operating room with IV fluids   running.  She was placed in the dorsal supine position after general anesthesia   was administered.  Her Xarelto had been stopped 3 days prior on Sunday.  She was   prepped and draped in the usual sterile fashion.      A Pfannenstiel skin incision was made and carried down to the underlying fascia.    This was extended with Bovie cautery.  The superior aspect of the fascia was   raised with Kochers and the rectus muscles dissected off.  This was done   inferiorly as well.  Abdominal entry was done out bluntly through the   peritoneum.  This was extended with manual traction.  There was no evidence of   vascular or visceral injury upon entry of the abdomen.  Next, an Emiliano   retractor was placed and the uterus was brought into the operative field.  Lap   sponges were used to pack the bowel back.      Attention was then turned to the right round ligament.  This was clamped, cut,   and suture ligated.  The IP was then clamped, cut, and suture ligated.    Hemostasis was excellent.  Bladder flap was created.  Uterine arteries were   skeletonized.  This was done on the left side as well.  Next, bilateral uterine   arteries were clamped, cut, and suture ligated.  The enlarged uterus was then   cut off with a knife for better visualization.  Next, using Kochnikole, we marched   down the cardinal ligaments, clamping, cutting,  and tying them off until we were   under the level of the cervix.  The colpotomy was then created and the cervix   and the lower uterine segment were removed from the field.  Eneida fixation   stitches were placed on the bilateral aspects of the vaginal cuff and then   interrupted chromic sutures were placed throughout the vaginal cuff for   hemostasis.  Hemostasis was excellent.  All pedicles were then rechecked.  There   was no evidence of any bleeding.  After copious irrigation of the pelvis,   Surgicel powder was then placed.  All laps were removed from the patient's   abdomen.  A correct sponge count was done before we started closure.  Next,   using a #1 chromic, the peritoneum was closed in a running fashion.  The fascia   was closed with a #1 Vicryl in a running fashion bilaterally, meeting in the   midline.  Next, plain gut suture was used to reapproximate the subcuticular   space, and staples were then placed on the incision.      The patient tolerated the procedure well.  All sponge, needle, and lap counts   were correct x2.  She was taken to the PACU in stable condition.        ______________________________  MD MARYLU Barnes/AQS  DD:  12/07/2022  Time:  02:39PM  DT:  12/07/2022  Time:  07:07PM  Job #:  [id number]      OPERATIVE REPORT

## 2022-12-09 PROBLEM — Z90.79 S/P TAH-BSO: Status: ACTIVE | Noted: 2022-12-09

## 2022-12-09 PROBLEM — Z90.710 S/P TAH-BSO: Status: ACTIVE | Noted: 2022-12-09

## 2022-12-09 PROBLEM — N95.0 POSTMENOPAUSAL BLEEDING: Status: ACTIVE | Noted: 2022-12-09

## 2022-12-09 PROBLEM — Z90.722 S/P TAH-BSO: Status: ACTIVE | Noted: 2022-12-09

## 2022-12-09 PROCEDURE — 25000003 PHARM REV CODE 250: Performed by: STUDENT IN AN ORGANIZED HEALTH CARE EDUCATION/TRAINING PROGRAM

## 2022-12-09 PROCEDURE — 11000001 HC ACUTE MED/SURG PRIVATE ROOM

## 2022-12-09 RX ORDER — OXYCODONE AND ACETAMINOPHEN 5; 325 MG/1; MG/1
1 TABLET ORAL EVERY 4 HOURS PRN
Qty: 30 TABLET | Refills: 0 | Status: SHIPPED | OUTPATIENT
Start: 2022-12-09

## 2022-12-09 RX ADMIN — IBUPROFEN 400 MG: 400 TABLET, FILM COATED ORAL at 12:12

## 2022-12-09 RX ADMIN — OXYCODONE AND ACETAMINOPHEN 1 TABLET: 10; 325 TABLET ORAL at 09:12

## 2022-12-09 RX ADMIN — DOCUSATE SODIUM 200 MG: 100 CAPSULE, LIQUID FILLED ORAL at 09:12

## 2022-12-09 RX ADMIN — OXYCODONE AND ACETAMINOPHEN 1 TABLET: 10; 325 TABLET ORAL at 12:12

## 2022-12-09 RX ADMIN — OXYCODONE HYDROCHLORIDE AND ACETAMINOPHEN 1 TABLET: 5; 325 TABLET ORAL at 03:12

## 2022-12-09 RX ADMIN — ONDANSETRON 8 MG: 4 TABLET, ORALLY DISINTEGRATING ORAL at 09:12

## 2022-12-09 RX ADMIN — POLYETHYLENE GLYCOL 3350 17 G: 17 POWDER, FOR SOLUTION ORAL at 09:12

## 2022-12-09 NOTE — PROGRESS NOTES
POD 2 from [facility] for continued PMB     AFVSS     Abdomen soft NTTP. Staples in place  Extremities normal but pt MO    Plan DC tomorrow if still making good progress.  Reassuring exam and progress thus far.

## 2022-12-09 NOTE — PROGRESS NOTES
POD 1 from [facility] for continued PMB    AFVSS    Abdomen soft NTTP. Large abdominal binder in place   No respiratory distress      Plan.  Lovenox started yesterday, 40 BID for BMI >40.  Walk with assistance only due to BMI of 60 and fall risk. Advance diet.  Plan to DC lovenox and start Xarelto tomorrow if continued stability. Pain control is good at this time.  Anticipate DC late tomorrow or over the weekend

## 2022-12-10 VITALS
TEMPERATURE: 98 F | HEART RATE: 79 BPM | WEIGHT: 293 LBS | HEIGHT: 69 IN | SYSTOLIC BLOOD PRESSURE: 107 MMHG | OXYGEN SATURATION: 98 % | DIASTOLIC BLOOD PRESSURE: 71 MMHG | BODY MASS INDEX: 43.4 KG/M2 | RESPIRATION RATE: 18 BRPM

## 2022-12-10 LAB
ESTROGEN SERPL-MCNC: NORMAL PG/ML
INSULIN SERPL-ACNC: NORMAL U[IU]/ML
LAB AP CLINICAL INFORMATION: NORMAL
LAB AP GROSS DESCRIPTION: NORMAL
LAB AP REPORT FOOTNOTES: NORMAL
T3RU NFR SERPL: NORMAL %

## 2022-12-10 PROCEDURE — 25000003 PHARM REV CODE 250: Performed by: STUDENT IN AN ORGANIZED HEALTH CARE EDUCATION/TRAINING PROGRAM

## 2022-12-10 RX ORDER — ONDANSETRON 4 MG/1
8 TABLET, ORALLY DISINTEGRATING ORAL EVERY 6 HOURS PRN
Qty: 25 TABLET | Refills: 1 | Status: SHIPPED | OUTPATIENT
Start: 2022-12-10

## 2022-12-10 RX ADMIN — OXYCODONE AND ACETAMINOPHEN 1 TABLET: 10; 325 TABLET ORAL at 04:12

## 2022-12-10 RX ADMIN — POLYETHYLENE GLYCOL 3350 17 G: 17 POWDER, FOR SOLUTION ORAL at 08:12

## 2022-12-10 RX ADMIN — IBUPROFEN 400 MG: 400 TABLET, FILM COATED ORAL at 05:12

## 2022-12-10 NOTE — DISCHARGE SUMMARY
The patient was admitted to the hospital on Wednesday.  She underwent a total abdominal hysterectomy for large uterine fibroids.  The patient is currently doing well her pain is a 2/10 she has no vaginal bleeding she is tolerating a regular diet limited activities and is ready for discharge.    Her condition on discharge is improved  Her disposition on discharge is to discharge to home.    Follow up in the office in 2-3 weeks.    Discharge diagnosis= postmenopausal bleeding, multiple uterine fibroids, pelvic pain

## 2022-12-10 NOTE — PLAN OF CARE
Problem: Adult Inpatient Plan of Care  Goal: Plan of Care Review  Outcome: Ongoing, Progressing  Goal: Patient-Specific Goal (Individualized)  Outcome: Ongoing, Progressing  Goal: Absence of Hospital-Acquired Illness or Injury  Outcome: Ongoing, Progressing  Goal: Optimal Comfort and Wellbeing  Outcome: Ongoing, Progressing  Goal: Readiness for Transition of Care  Outcome: Ongoing, Progressing     Problem: Bariatric Environmental Safety  Goal: Safety Maintained with Care  Outcome: Ongoing, Progressing     Problem: Infection  Goal: Absence of Infection Signs and Symptoms  Outcome: Ongoing, Progressing     Problem: Pain Acute  Goal: Acceptable Pain Control and Functional Ability  Outcome: Ongoing, Progressing     Problem: Fall Injury Risk  Goal: Absence of Fall and Fall-Related Injury  Outcome: Ongoing, Progressing

## 2023-01-10 ENCOUNTER — TELEPHONE (OUTPATIENT)
Dept: ORTHOPEDICS | Facility: CLINIC | Age: 58
End: 2023-01-10
Payer: COMMERCIAL

## 2023-01-10 NOTE — TELEPHONE ENCOUNTER
Attempted to call pt at 1300 - left  stating that workers comp denied her synvisc. Informed her that she can come in to discuss other options.

## 2023-01-11 ENCOUNTER — OFFICE VISIT (OUTPATIENT)
Dept: ORTHOPEDICS | Facility: CLINIC | Age: 58
End: 2023-01-11
Payer: OTHER MISCELLANEOUS

## 2023-01-11 DIAGNOSIS — M17.12 PRIMARY OSTEOARTHRITIS OF LEFT KNEE: Primary | ICD-10-CM

## 2023-01-11 PROCEDURE — 99213 OFFICE O/P EST LOW 20 MIN: CPT | Mod: 25,,, | Performed by: ORTHOPAEDIC SURGERY

## 2023-01-11 PROCEDURE — 20610 LARGE JOINT ASPIRATION/INJECTION: L KNEE: ICD-10-PCS | Mod: LT,,, | Performed by: ORTHOPAEDIC SURGERY

## 2023-01-11 PROCEDURE — 99213 PR OFFICE/OUTPT VISIT, EST, LEVL III, 20-29 MIN: ICD-10-PCS | Mod: 25,,, | Performed by: ORTHOPAEDIC SURGERY

## 2023-01-11 PROCEDURE — 20610 DRAIN/INJ JOINT/BURSA W/O US: CPT | Mod: LT,,, | Performed by: ORTHOPAEDIC SURGERY

## 2023-01-11 RX ORDER — BETAMETHASONE SODIUM PHOSPHATE AND BETAMETHASONE ACETATE 3; 3 MG/ML; MG/ML
12 INJECTION, SUSPENSION INTRA-ARTICULAR; INTRALESIONAL; INTRAMUSCULAR; SOFT TISSUE
Status: DISCONTINUED | OUTPATIENT
Start: 2023-01-11 | End: 2023-01-11 | Stop reason: HOSPADM

## 2023-01-11 RX ORDER — LIDOCAINE HYDROCHLORIDE 20 MG/ML
3 INJECTION, SOLUTION INFILTRATION; PERINEURAL
Status: DISCONTINUED | OUTPATIENT
Start: 2023-01-11 | End: 2023-01-11 | Stop reason: HOSPADM

## 2023-01-11 RX ADMIN — LIDOCAINE HYDROCHLORIDE 3 MG: 20 INJECTION, SOLUTION INFILTRATION; PERINEURAL at 03:01

## 2023-01-11 RX ADMIN — BETAMETHASONE SODIUM PHOSPHATE AND BETAMETHASONE ACETATE 12 MG: 3; 3 INJECTION, SUSPENSION INTRA-ARTICULAR; INTRALESIONAL; INTRAMUSCULAR; SOFT TISSUE at 03:01

## 2023-01-11 NOTE — PROCEDURES
Large Joint Aspiration/Injection: L knee    Date/Time: 1/11/2023 3:30 PM  Performed by: Nahun Schneider MD  Authorized by: Nahun Schneider MD     Consent Done?:  Yes (Verbal)  Indications:  Pain  Site marked: the procedure site was marked    Prep: patient was prepped and draped in usual sterile fashion    Approach:  Anterolateral  Location:  Knee  Site:  L knee  Medications:  12 mg betamethasone acetate-betamethasone sodium phosphate 6 mg/mL; 3 mg LIDOcaine HCL 20 mg/ml (2%) 20 mg/mL (2 %)  Patient tolerance:  Patient tolerated the procedure well with no immediate complications

## 2023-01-11 NOTE — PROGRESS NOTES
Subjective:    CC: Injections of the Left Knee and Injections (pt here for Lt knee cortisone injection, synvisc one 7/6/22 pt having knee pain, ambulating with a cane,ice and elevation therapy . )       HPI:  Patient returns today for repeat exam.  Patient states she had a little setback, had recent surgery, is back using a cane otherwise she continues to have pain with long standing walking and bending.  She denies any new or specific trauma she denies other complaints.    ROS: Refer to HPI for pertinent ROS. All other 12 point systems negative.    Objective:  Vitals:    01/11/23 1550   BP: Comment: unable to obtain        Physical Exam:  The patient is well-nourished, well-developed and in no apparent distress, pleasant and cooperative. Examination of the left lower extremity compartments are soft and warm. Skin is intact. There are no signs or symptoms of DVT or infection. There is no obvious joint effusion. There is no erythema. Tender to palpation along the mediolateral joint line , left knee range of motion is 0-100. The knee is stable to exam with varus and valgus stressing. Negative anterior and posterior drawer. Negative Lachman´s.  Negative Suzanne's test. Patella grind is positive, Negative for apprehension. Neurovascularly intact distally.    Images: . Images Reviewed and discussed with patient.    Assessment:  1. Primary osteoarthritis of left knee  - Large Joint Aspiration/Injection: L knee        Plan:  At this time we discussed her physical exam and previous imaging findings.  She tolerated her injection very well to the left knee, she will continue low-impact activities, like see back in 3 months to see how she is progressing.    Follow UP: No follow-ups on file.    Large Joint Aspiration/Injection: L knee    Date/Time: 1/11/2023 3:30 PM  Performed by: Nahun Schneider MD  Authorized by: Nahun Schneider MD     Consent Done?:  Yes (Verbal)  Indications:  Pain  Site marked: the procedure site was  marked    Prep: patient was prepped and draped in usual sterile fashion    Approach:  Anterolateral  Location:  Knee  Site:  L knee  Medications:  12 mg betamethasone acetate-betamethasone sodium phosphate 6 mg/mL; 3 mg LIDOcaine HCL 20 mg/ml (2%) 20 mg/mL (2 %)  Patient tolerance:  Patient tolerated the procedure well with no immediate complications

## 2023-02-21 ENCOUNTER — OFFICE VISIT (OUTPATIENT)
Dept: URGENT CARE | Facility: CLINIC | Age: 58
End: 2023-02-21
Payer: COMMERCIAL

## 2023-02-21 ENCOUNTER — TELEPHONE (OUTPATIENT)
Dept: URGENT CARE | Facility: CLINIC | Age: 58
End: 2023-02-21

## 2023-02-21 VITALS
WEIGHT: 293 LBS | HEART RATE: 89 BPM | DIASTOLIC BLOOD PRESSURE: 85 MMHG | OXYGEN SATURATION: 98 % | RESPIRATION RATE: 16 BRPM | TEMPERATURE: 98 F | BODY MASS INDEX: 43.4 KG/M2 | SYSTOLIC BLOOD PRESSURE: 162 MMHG | HEIGHT: 69 IN

## 2023-02-21 DIAGNOSIS — M25.562 ACUTE PAIN OF LEFT KNEE: ICD-10-CM

## 2023-02-21 DIAGNOSIS — M79.632 LEFT FOREARM PAIN: ICD-10-CM

## 2023-02-21 DIAGNOSIS — S83.92XA SPRAIN OF LEFT KNEE, UNSPECIFIED LIGAMENT, INITIAL ENCOUNTER: Primary | ICD-10-CM

## 2023-02-21 DIAGNOSIS — S63.502A FOREARM SPRAIN, LEFT, INITIAL ENCOUNTER: ICD-10-CM

## 2023-02-21 DIAGNOSIS — M89.8X2 PAIN OF LEFT HUMERUS: ICD-10-CM
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
## 2023-02-21 PROCEDURE — 1160F RVW MEDS BY RX/DR IN RCRD: CPT | Mod: CPTII,,, | Performed by: FAMILY MEDICINE

## 2023-02-21 PROCEDURE — 3077F SYST BP >= 140 MM HG: CPT | Mod: CPTII,,, | Performed by: FAMILY MEDICINE

## 2023-02-21 PROCEDURE — 3079F DIAST BP 80-89 MM HG: CPT | Mod: CPTII,,, | Performed by: FAMILY MEDICINE

## 2023-02-21 PROCEDURE — 1159F PR MEDICATION LIST DOCUMENTED IN MEDICAL RECORD: ICD-10-PCS | Mod: CPTII,,, | Performed by: FAMILY MEDICINE

## 2023-02-21 PROCEDURE — 99214 PR OFFICE/OUTPT VISIT, EST, LEVL IV, 30-39 MIN: ICD-10-PCS | Mod: ,,, | Performed by: FAMILY MEDICINE

## 2023-02-21 PROCEDURE — 1160F PR REVIEW ALL MEDS BY PRESCRIBER/CLIN PHARMACIST DOCUMENTED: ICD-10-PCS | Mod: CPTII,,, | Performed by: FAMILY MEDICINE

## 2023-02-21 PROCEDURE — 3079F PR MOST RECENT DIASTOLIC BLOOD PRESSURE 80-89 MM HG: ICD-10-PCS | Mod: CPTII,,, | Performed by: FAMILY MEDICINE

## 2023-02-21 PROCEDURE — 3077F PR MOST RECENT SYSTOLIC BLOOD PRESSURE >= 140 MM HG: ICD-10-PCS | Mod: CPTII,,, | Performed by: FAMILY MEDICINE

## 2023-02-21 PROCEDURE — 1159F MED LIST DOCD IN RCRD: CPT | Mod: CPTII,,, | Performed by: FAMILY MEDICINE

## 2023-02-21 PROCEDURE — 3008F PR BODY MASS INDEX (BMI) DOCUMENTED: ICD-10-PCS | Mod: CPTII,,, | Performed by: FAMILY MEDICINE

## 2023-02-21 PROCEDURE — 3008F BODY MASS INDEX DOCD: CPT | Mod: CPTII,,, | Performed by: FAMILY MEDICINE

## 2023-02-21 PROCEDURE — 99214 OFFICE O/P EST MOD 30 MIN: CPT | Mod: ,,, | Performed by: FAMILY MEDICINE

## 2023-02-21 RX ORDER — IBUPROFEN 800 MG/1
800 TABLET ORAL 3 TIMES DAILY
Qty: 21 TABLET | Refills: 0 | Status: SHIPPED | OUTPATIENT
Start: 2023-02-21 | End: 2023-02-28

## 2023-02-21 RX ORDER — SEMAGLUTIDE 2.68 MG/ML
INJECTION, SOLUTION SUBCUTANEOUS
COMMUNITY

## 2023-02-21 NOTE — PROGRESS NOTES
"Subjective:       Patient ID: Poppy Weller is a 57 y.o. female.    Vitals:  height is 5' 9" (1.753 m) and weight is 175.5 kg (387 lb) (abnormal). Her temperature is 97.8 °F (36.6 °C). Her blood pressure is 162/85 (abnormal) and her pulse is 89. Her respiration is 16 and oxygen saturation is 98%.     Chief Complaint: Fall (Fall x this morning  - left upper arm pain, left knee pain )    Fall forward while walking with cane this am.  Pain to L arm and L knee.  No LOC, no head trauma.       Constitution: Negative for chills and fever.   Cardiovascular:  Negative for chest pain and passing out.   Musculoskeletal:  Positive for pain, trauma and joint swelling. Negative for back pain.   Skin:  Positive for abrasion.   Neurological:  Negative for dizziness and altered mental status.   Psychiatric/Behavioral:  Negative for altered mental status.      Objective:      Physical Exam   Constitutional: She is oriented to person, place, and time.   HENT:   Nose: Nose normal.   Mouth/Throat: Mucous membranes are moist.   Cardiovascular: An irregular rhythm present.   Pulmonary/Chest: Effort normal.   Abdominal: Normal appearance.   Musculoskeletal:      Comments: FROM of L wrist,  intact, pos TTP of lateral proximal forearm, FROM and no TTP of the L elbow, L humerus with TTP mainly medial side.  At baseline reduced ROM of the L shoulder.    Neurological: no focal deficit. She is alert and oriented to person, place, and time.   Skin: Capillary refill takes less than 2 seconds.         Comments: Area of ecchymosis to base of 5th digit, L forearm with scattered ecchymosis, pos swelling at scar line to humerus.    Psychiatric: Mood normal.   Nursing note and vitals reviewed.      Assessment:       1. Sprain of left knee, unspecified ligament, initial encounter    2. Fall, initial encounter    3. Acute pain of left knee    4. Left forearm pain    5. Pain of left humerus    6. Forearm sprain, left, initial encounter      "     Plan:         Sprain of left knee, unspecified ligament, initial encounter  -     ibuprofen (ADVIL,MOTRIN) 800 MG tablet; Take 1 tablet (800 mg total) by mouth 3 (three) times daily. for 7 days  Dispense: 21 tablet; Refill: 0    Fall, initial encounter    Acute pain of left knee  -     X-Ray Knee Complete 4 or More Views Left; Future; Expected date: 02/21/2023    Left forearm pain  -     XR FOREARM LEFT; Future; Expected date: 02/21/2023    Pain of left humerus  -     XR HUMERUS 2 VIEW LEFT; Future; Expected date: 02/21/2023  -     ibuprofen (ADVIL,MOTRIN) 800 MG tablet; Take 1 tablet (800 mg total) by mouth 3 (three) times daily. for 7 days  Dispense: 21 tablet; Refill: 0    Forearm sprain, left, initial encounter  -     ibuprofen (ADVIL,MOTRIN) 800 MG tablet; Take 1 tablet (800 mg total) by mouth 3 (three) times daily. for 7 days  Dispense: 21 tablet; Refill: 0            X ray of humerus per my review and per final no acute concern.  Same for the knee.

## 2023-03-02 ENCOUNTER — LAB VISIT (OUTPATIENT)
Dept: LAB | Facility: HOSPITAL | Age: 58
End: 2023-03-02
Attending: INTERNAL MEDICINE
Payer: COMMERCIAL

## 2023-03-02 DIAGNOSIS — E11.9 DIABETES MELLITUS WITHOUT COMPLICATION: Primary | ICD-10-CM

## 2023-03-02 DIAGNOSIS — I10 ESSENTIAL HYPERTENSION, MALIGNANT: ICD-10-CM

## 2023-03-02 DIAGNOSIS — E78.5 HYPERLIPIDEMIA, UNSPECIFIED HYPERLIPIDEMIA TYPE: ICD-10-CM

## 2023-03-02 DIAGNOSIS — Z79.899 ENCOUNTER FOR LONG-TERM (CURRENT) USE OF OTHER MEDICATIONS: ICD-10-CM

## 2023-03-02 LAB
ALBUMIN SERPL-MCNC: 3.9 G/DL (ref 3.5–5)
ALBUMIN/GLOB SERPL: 1 RATIO (ref 1.1–2)
ALP SERPL-CCNC: 93 UNIT/L (ref 40–150)
ALT SERPL-CCNC: 25 UNIT/L (ref 0–55)
AST SERPL-CCNC: 27 UNIT/L (ref 5–34)
BILIRUBIN DIRECT+TOT PNL SERPL-MCNC: 0.5 MG/DL
BUN SERPL-MCNC: 11.9 MG/DL (ref 9.8–20.1)
CALCIUM SERPL-MCNC: 9.8 MG/DL (ref 8.4–10.2)
CHLORIDE SERPL-SCNC: 100 MMOL/L (ref 98–107)
CHOLEST SERPL-MCNC: 174 MG/DL
CHOLEST/HDLC SERPL: 3 {RATIO} (ref 0–5)
CO2 SERPL-SCNC: 31 MMOL/L (ref 22–29)
CREAT SERPL-MCNC: 0.68 MG/DL (ref 0.55–1.02)
ERYTHROCYTE [DISTWIDTH] IN BLOOD BY AUTOMATED COUNT: 18.3 % (ref 11.5–17)
EST. AVERAGE GLUCOSE BLD GHB EST-MCNC: 122.6 MG/DL
GFR SERPLBLD CREATININE-BSD FMLA CKD-EPI: >60 MLS/MIN/1.73/M2
GLOBULIN SER-MCNC: 3.8 GM/DL (ref 2.4–3.5)
GLUCOSE SERPL-MCNC: 101 MG/DL (ref 74–100)
HBA1C MFR BLD: 5.9 %
HCT VFR BLD AUTO: 47.7 % (ref 37–47)
HDLC SERPL-MCNC: 63 MG/DL (ref 35–60)
HGB BLD-MCNC: 13.9 G/DL (ref 12–16)
LDLC SERPL CALC-MCNC: 100 MG/DL (ref 50–140)
MCH RBC QN AUTO: 23.6 PG
MCHC RBC AUTO-ENTMCNC: 29.1 G/DL (ref 33–36)
MCV RBC AUTO: 80.8 FL (ref 80–94)
PLATELET # BLD AUTO: 203 X10(3)/MCL (ref 130–400)
PMV BLD AUTO: 11.5 FL (ref 7.4–10.4)
POTASSIUM SERPL-SCNC: 4 MMOL/L (ref 3.5–5.1)
PROT SERPL-MCNC: 7.7 GM/DL (ref 6.4–8.3)
RBC # BLD AUTO: 5.9 X10(6)/MCL (ref 4.2–5.4)
SODIUM SERPL-SCNC: 142 MMOL/L (ref 136–145)
TRIGL SERPL-MCNC: 56 MG/DL (ref 37–140)
VLDLC SERPL CALC-MCNC: 11 MG/DL
WBC # SPEC AUTO: 6.4 X10(3)/MCL (ref 4.5–11.5)

## 2023-03-02 PROCEDURE — 36415 COLL VENOUS BLD VENIPUNCTURE: CPT

## 2023-03-02 PROCEDURE — 85027 COMPLETE CBC AUTOMATED: CPT

## 2023-03-02 PROCEDURE — 80061 LIPID PANEL: CPT

## 2023-03-02 PROCEDURE — 83036 HEMOGLOBIN GLYCOSYLATED A1C: CPT

## 2023-03-02 PROCEDURE — 80053 COMPREHEN METABOLIC PANEL: CPT

## 2023-04-18 ENCOUNTER — HOSPITAL ENCOUNTER (EMERGENCY)
Facility: HOSPITAL | Age: 58
Discharge: HOME | End: 2023-04-18
Attending: FAMILY MEDICINE
Payer: COMMERCIAL

## 2023-04-18 VITALS
HEART RATE: 76 BPM | RESPIRATION RATE: 18 BRPM | WEIGHT: 293 LBS | DIASTOLIC BLOOD PRESSURE: 73 MMHG | HEIGHT: 69 IN | BODY MASS INDEX: 43.4 KG/M2 | OXYGEN SATURATION: 96 % | SYSTOLIC BLOOD PRESSURE: 109 MMHG | TEMPERATURE: 98 F

## 2023-04-18 DIAGNOSIS — B34.9 VIRAL SYNDROME: ICD-10-CM

## 2023-04-18 DIAGNOSIS — R11.2 NAUSEA & VOMITING: ICD-10-CM

## 2023-04-18 DIAGNOSIS — R42 DIZZINESS: Primary | ICD-10-CM

## 2023-04-18 DIAGNOSIS — R53.1 WEAKNESS: ICD-10-CM

## 2023-04-18 LAB
ANION GAP SERPL CALC-SCNC: 8 MEQ/L
APPEARANCE UR: CLEAR
BACTERIA #/AREA URNS AUTO: NORMAL /HPF
BASOPHILS # BLD AUTO: 0.04 X10(3)/MCL (ref 0–0.2)
BASOPHILS NFR BLD AUTO: 0.6 %
BILIRUB UR QL STRIP.AUTO: NEGATIVE MG/DL
BUN SERPL-MCNC: 12.6 MG/DL (ref 9.8–20.1)
CALCIUM SERPL-MCNC: 9.5 MG/DL (ref 8.4–10.2)
CHLORIDE SERPL-SCNC: 101 MMOL/L (ref 98–107)
CO2 SERPL-SCNC: 32 MMOL/L (ref 22–29)
COLOR UR AUTO: YELLOW
CREAT SERPL-MCNC: 0.69 MG/DL (ref 0.55–1.02)
CREAT/UREA NIT SERPL: 18
EOSINOPHIL # BLD AUTO: 0.14 X10(3)/MCL (ref 0–0.9)
EOSINOPHIL NFR BLD AUTO: 2 %
ERYTHROCYTE [DISTWIDTH] IN BLOOD BY AUTOMATED COUNT: 17.8 % (ref 11.5–17)
FLUAV AG UPPER RESP QL IA.RAPID: NOT DETECTED
FLUBV AG UPPER RESP QL IA.RAPID: NOT DETECTED
GFR SERPLBLD CREATININE-BSD FMLA CKD-EPI: >60 MLS/MIN/1.73/M2
GLUCOSE SERPL-MCNC: 101 MG/DL (ref 74–100)
GLUCOSE UR QL STRIP.AUTO: >=1000 MG/DL
HCT VFR BLD AUTO: 48.9 % (ref 37–47)
HGB BLD-MCNC: 14.2 G/DL (ref 12–16)
IMM GRANULOCYTES # BLD AUTO: 0.01 X10(3)/MCL (ref 0–0.04)
IMM GRANULOCYTES NFR BLD AUTO: 0.1 %
KETONES UR QL STRIP.AUTO: NEGATIVE MG/DL
LEUKOCYTE ESTERASE UR QL STRIP.AUTO: NEGATIVE UNIT/L
LYMPHOCYTES # BLD AUTO: 2.17 X10(3)/MCL (ref 0.6–4.6)
LYMPHOCYTES NFR BLD AUTO: 30.3 %
MAGNESIUM SERPL-MCNC: 1.8 MG/DL (ref 1.6–2.6)
MCH RBC QN AUTO: 24.4 PG (ref 27–31)
MCHC RBC AUTO-ENTMCNC: 29 G/DL (ref 33–36)
MCV RBC AUTO: 83.9 FL (ref 80–94)
MONOCYTES # BLD AUTO: 0.43 X10(3)/MCL (ref 0.1–1.3)
MONOCYTES NFR BLD AUTO: 6 %
NEUTROPHILS # BLD AUTO: 4.37 X10(3)/MCL (ref 2.1–9.2)
NEUTROPHILS NFR BLD AUTO: 61 %
NITRITE UR QL STRIP.AUTO: NEGATIVE
PH UR STRIP.AUTO: 7 [PH]
PLATELET # BLD AUTO: 206 X10(3)/MCL (ref 130–400)
PMV BLD AUTO: 12 FL (ref 7.4–10.4)
POC CARDIAC TROPONIN I: 0 NG/ML (ref 0–0.08)
POTASSIUM SERPL-SCNC: 3.4 MMOL/L (ref 3.5–5.1)
PROT UR QL STRIP.AUTO: NEGATIVE MG/DL
RBC # BLD AUTO: 5.83 X10(6)/MCL (ref 4.2–5.4)
RBC #/AREA URNS AUTO: NORMAL /HPF
RBC UR QL AUTO: NEGATIVE UNIT/L
RSV A 5' UTR RNA NPH QL NAA+PROBE: NOT DETECTED
SAMPLE: NORMAL
SARS-COV-2 RNA RESP QL NAA+PROBE: NOT DETECTED
SODIUM SERPL-SCNC: 141 MMOL/L (ref 136–145)
SP GR UR STRIP.AUTO: 1.01
SQUAMOUS #/AREA URNS AUTO: NORMAL /HPF
UROBILINOGEN UR STRIP-ACNC: 1 MG/DL
WBC # SPEC AUTO: 7.2 X10(3)/MCL (ref 4.5–11.5)
WBC #/AREA URNS AUTO: NORMAL /HPF

## 2023-04-18 PROCEDURE — 0241U COVID/RSV/FLU A&B PCR: CPT | Performed by: FAMILY MEDICINE

## 2023-04-18 PROCEDURE — 93005 ELECTROCARDIOGRAM TRACING: CPT

## 2023-04-18 PROCEDURE — 84484 ASSAY OF TROPONIN QUANT: CPT

## 2023-04-18 PROCEDURE — 85025 COMPLETE CBC W/AUTO DIFF WBC: CPT | Performed by: FAMILY MEDICINE

## 2023-04-18 PROCEDURE — 81001 URINALYSIS AUTO W/SCOPE: CPT | Performed by: FAMILY MEDICINE

## 2023-04-18 PROCEDURE — 96374 THER/PROPH/DIAG INJ IV PUSH: CPT

## 2023-04-18 PROCEDURE — 93010 ELECTROCARDIOGRAM REPORT: CPT | Mod: ,,, | Performed by: INTERNAL MEDICINE

## 2023-04-18 PROCEDURE — 83735 ASSAY OF MAGNESIUM: CPT | Performed by: FAMILY MEDICINE

## 2023-04-18 PROCEDURE — 63600175 PHARM REV CODE 636 W HCPCS: Performed by: FAMILY MEDICINE

## 2023-04-18 PROCEDURE — 99285 EMERGENCY DEPT VISIT HI MDM: CPT | Mod: 25

## 2023-04-18 PROCEDURE — 25000003 PHARM REV CODE 250: Performed by: FAMILY MEDICINE

## 2023-04-18 PROCEDURE — 80048 BASIC METABOLIC PNL TOTAL CA: CPT | Performed by: FAMILY MEDICINE

## 2023-04-18 PROCEDURE — 96361 HYDRATE IV INFUSION ADD-ON: CPT

## 2023-04-18 PROCEDURE — 93010 EKG 12-LEAD: ICD-10-PCS | Mod: ,,, | Performed by: INTERNAL MEDICINE

## 2023-04-18 RX ORDER — ONDANSETRON 2 MG/ML
4 INJECTION INTRAMUSCULAR; INTRAVENOUS
Status: COMPLETED | OUTPATIENT
Start: 2023-04-18 | End: 2023-04-18

## 2023-04-18 RX ORDER — ONDANSETRON 4 MG/1
4 TABLET, ORALLY DISINTEGRATING ORAL 2 TIMES DAILY
Qty: 20 TABLET | Refills: 0 | Status: SHIPPED | OUTPATIENT
Start: 2023-04-18

## 2023-04-18 RX ADMIN — SODIUM CHLORIDE 1000 ML: 9 INJECTION, SOLUTION INTRAVENOUS at 11:04

## 2023-04-18 RX ADMIN — ONDANSETRON HYDROCHLORIDE 4 MG: 2 SOLUTION INTRAMUSCULAR; INTRAVENOUS at 11:04

## 2023-04-18 NOTE — ED PROVIDER NOTES
Encounter Date: 4/18/2023       History     Chief Complaint   Patient presents with    Dizziness     Pt presents with c/o nausea and dizziness; n/v started this morning around 3am, states now just dry heaving; dizziness is worse when lying down     58-year-old presents some nausea and vomiting since about 3:00 a.m. this morning some some dizziness mostly with position changes no diarrhea no chest pain no shortness of breath no fevers no chills      Review of patient's allergies indicates:  No Known Allergies  Past Medical History:   Diagnosis Date    Atrial fibrillation     History of pericarditis     HLD (hyperlipidemia)     HTN (hypertension)     Obesity     Sleep apnea      Past Surgical History:   Procedure Laterality Date    APPENDECTOMY      CARDIOVERSION  02/21/2017    CHOLECYSTECTOMY      FRACTURE SURGERY  05/26/2022    Lt arm    ORIF HUMERUS FRACTURE Left 02/16/2022    with bone graft  nonunion    TONSILLECTOMY      TOTAL ABDOMINAL HYSTERECTOMY W/ BILATERAL SALPINGOOPHORECTOMY N/A 12/07/2022    Procedure: HYSTERECTOMY, TOTAL, ABDOMINAL, WITH BILATERAL SALPINGO-OOPHORECTOMY;  Surgeon: Rancho Easley MD;  Location: [facility];  Service: OB/GYN;  Laterality: N/A;  ABNER / BSO //  (NO ROBOT)    TRANSESOPHAGEAL ECHOCARDIOGRAPHY  02/21/2017    VARICOSE VEIN STRIPPING  01/01/2019    Dilation of Left Common Iliac Vein   12/20/2017    Intramedullary Ananth Insertion Humerus  Left 05/27/2021    Pericardial window operation       Family History   Problem Relation Age of Onset    Diabetes Mother     Heart failure Mother     Hypertension Mother     Heart failure Father     Hypertension Father     Cancer Father     No Known Problems Sister     No Known Problems Brother      Social History     Tobacco Use    Smoking status: Never    Smokeless tobacco: Never   Substance Use Topics    Alcohol use: Never    Drug use: Never     Review of Systems   Constitutional:  Negative for fever.   HENT:  Negative for sore throat.     Respiratory:  Negative for shortness of breath.    Cardiovascular:  Negative for chest pain.   Gastrointestinal:  Positive for nausea and vomiting.   Genitourinary:  Negative for dysuria.   Musculoskeletal:  Negative for back pain.   Skin:  Negative for rash.   Neurological:  Negative for weakness.   Hematological:  Does not bruise/bleed easily.   All other systems reviewed and are negative.    Physical Exam     Initial Vitals [04/18/23 1054]   BP Pulse Resp Temp SpO2   133/69 73 18 97.9 °F (36.6 °C) 100 %      MAP       --         Physical Exam    Nursing note and vitals reviewed.  Constitutional: She appears well-developed and well-nourished. She is active.   HENT:   Head: Normocephalic and atraumatic.   Eyes: Conjunctivae, EOM and lids are normal. Pupils are equal, round, and reactive to light.   Neck: Trachea normal and phonation normal. Neck supple. No thyroid mass present.   Normal range of motion.  Cardiovascular:  Normal rate, regular rhythm, normal heart sounds and normal pulses.           Pulmonary/Chest: Breath sounds normal.   Abdominal: Abdomen is soft. Bowel sounds are normal.   Musculoskeletal:         General: Normal range of motion.      Cervical back: Normal range of motion and neck supple.     Neurological: She is alert and oriented to person, place, and time.   Skin: Skin is warm and intact.   Psychiatric: She has a normal mood and affect. Her speech is normal and behavior is normal. Judgment and thought content normal. Cognition and memory are normal.       ED Course   Procedures  Labs Reviewed   BASIC METABOLIC PANEL - Abnormal; Notable for the following components:       Result Value    Potassium Level 3.4 (*)     Carbon Dioxide 32 (*)     Glucose Level 101 (*)     All other components within normal limits   URINALYSIS, REFLEX TO URINE CULTURE - Abnormal; Notable for the following components:    Glucose, UA >=1000 (*)     All other components within normal limits   CBC WITH DIFFERENTIAL -  Abnormal; Notable for the following components:    RBC 5.83 (*)     Hct 48.9 (*)     MCH 24.4 (*)     MCHC 29.0 (*)     RDW 17.8 (*)     MPV 12.0 (*)     All other components within normal limits   COVID/RSV/FLU A&B PCR - Normal    Narrative:     The Xpert Xpress SARS-CoV-2/FLU/RSV plus is a rapid, multiplexed real-time PCR test intended for the simultaneous qualitative detection and differentiation of SARS-CoV-2, Influenza A, Influenza B, and respiratory syncytial virus (RSV) viral RNA in either nasopharyngeal swab or nasal swab specimens.         MAGNESIUM - Normal   URINALYSIS, MICROSCOPIC - Normal   CBC W/ AUTO DIFFERENTIAL    Narrative:     The following orders were created for panel order CBC Auto Differential.  Procedure                               Abnormality         Status                     ---------                               -----------         ------                     CBC with Differential[[id number]]        Abnormal            Final result                 Please view results for these tests on the individual orders.   TROPONIN ISTAT   POCT TROPONIN        ECG Results              EKG 12-lead (In process)  Result time 04/18/23 11:40:29      In process by Interface, Lab In [facility] (04/18/23 11:40:29)                   Narrative:    Test Reason : R53.1,    Vent. Rate : 077 BPM     Atrial Rate : 077 BPM     P-R Int : 152 ms          QRS Dur : 088 ms      QT Int : 396 ms       P-R-T Axes : 059 -24 -10 degrees     QTc Int : 448 ms    Normal sinus rhythm  Cannot rule out Anterior infarct ,age undetermined  Abnormal ECG  No previous ECGs available    Referred By: [email or web address]   SELF           Confirmed By:                                   Imaging Results              X-Ray Abdomen Flat And Erect (Final result)  Result time 04/18/23 14:53:11      Final result by Barb Mcrae MD (04/18/23 14:53:11)                   Impression:      Moderate colonic stool burden.      Electronically signed  by: Barb Mcrae  Date:    04/18/2023  Time:    14:53               Narrative:    EXAMINATION:  XR ABDOMEN FLAT AND ERECT    CLINICAL HISTORY:  Nausea with vomiting, unspecified    TECHNIQUE:  Supine and upright views of the abdomen performed.    COMPARISON:  Chest radiograph 05/13/2022    FINDINGS:  Chronic elevation the right hemidiaphragm.  Surgical clips project over the gallbladder fossa.    No evidence of free intraperitoneal air.  No dilated bowel loops seen.  Moderate colonic stool burden.    Common iliac vein stents.                                       Medications   sodium chloride 0.9% bolus 1,000 mL 1,000 mL (0 mLs Intravenous Stopped 4/18/23 1212)   ondansetron injection 4 mg (4 mg Intravenous Given 4/18/23 1112)     Medical Decision Making:   Initial Assessment:   58-year-old presents some nausea and vomiting since about 3:00 a.m. this morning some some dizziness mostly with position changes no diarrhea no chest pain no shortness of breath no fevers no chills      Differential Diagnosis:   Differential diagnosis dehydration viral syndrome COVID flu RSV pneumonia viral gastroenteritis  Clinical Tests:   Lab Tests: Ordered and Reviewed  The following lab test(s) were unremarkable: BMP, CBC and Urinalysis  Radiological Study: Ordered and Reviewed  ED Management:  Patient received lab x-rays medicines IV fluids felt much better           ED Course as of 04/18/23 1528   Tue Apr 18, 2023   1144 Magnesium: 1.80 [BL]   1144 POC Cardiac Troponin I: 0.00 [BL]   1144 Sodium: 141 [BL]   1144 Potassium(!): 3.4 [BL]   1144 Chloride: 101 [BL]   1144 CO2(!): 32 [BL]   1144 Glucose(!): 101 [BL]   1144 BUN: 12.6 [BL]   1144 Creatinine: 0.69 [BL]   1144 WBC: 7.2 [BL]   1144 Hemoglobin: 14.2 [BL]   1158 Influenza A, Molecular: Not Detected [BL]   1158 Influenza B, Molecular: Not Detected [BL]   1158 RSV Ag by Molecular Method: Not Detected [BL]   1158 SARS-CoV2 (COVID-19) Qualitative PCR: Not Detected [BL]   1158  Sodium: 141 [BL]   1158 Potassium(!): 3.4 [BL]   1158 Chloride: 101 [BL]   1158 CO2(!): 32 [BL]   1158 Glucose(!): 101 [BL]   1158 BUN: 12.6 [BL]   1158 Creatinine: 0.69 [BL]   1158 POC Cardiac Troponin I: 0.00 [BL]   1158 Magnesium: 1.80 [BL]   1158 Hematocrit(!): 48.9 [BL]   1158 WBC: 7.2 [BL]   1246 POC Cardiac Troponin I: 0.00 [BL]   1246 Magnesium: 1.80 [BL]   1246 Hemoglobin: 14.2 [BL]   1246 Hematocrit(!): 48.9 [BL]   1246 WBC: 7.2 [BL]   1246 Sodium: 141 [BL]   1246 Potassium(!): 3.4 [BL]   1246 Chloride: 101 [BL]   1246 CO2(!): 32 [BL]   1246 Glucose(!): 101 [BL]   1246 BUN: 12.6 [BL]   1246 Creatinine: 0.69 [BL]   1246 BUN/CREAT RATIO: 18 [BL]   1246 SARS-CoV2 (COVID-19) Qualitative PCR: Not Detected [BL]   1246 RSV Ag by Molecular Method: Not Detected [BL]   1246 Influenza B, Molecular: Not Detected [BL]   1246 Influenza A, Molecular: Not Detected [BL]   1246 POC Cardiac Troponin I: 0.00 [BL]      ED Course User Index  [BL] Néstor Caban MD                 Clinical Impression:   Final diagnoses:  [R53.1] Weakness  [R11.2] Nausea & vomiting  [R42] Dizziness (Primary)  [B34.9] Viral syndrome        ED Disposition Condition    Discharge Stable          ED Prescriptions       Medication Sig Dispense Start Date End Date Auth. Provider    ondansetron (ZOFRAN-ODT) 4 MG TbDL Take 1 tablet (4 mg total) by mouth 2 (two) times daily. 20 tablet 4/18/2023 -- Néstor Caban MD          Follow-up Information       Follow up With Specialties Details Why Contact Info    Jermaine Scott MD Internal Medicine In 3 days  [address]  [address]  [place] [address]  [phone]               Néstor Caban MD  04/18/23 7693

## 2023-04-19 ENCOUNTER — OFFICE VISIT (OUTPATIENT)
Dept: ORTHOPEDICS | Facility: CLINIC | Age: 58
End: 2023-04-19
Payer: OTHER MISCELLANEOUS

## 2023-04-19 VITALS
HEIGHT: 69 IN | TEMPERATURE: 98 F | HEART RATE: 67 BPM | WEIGHT: 293 LBS | SYSTOLIC BLOOD PRESSURE: 125 MMHG | DIASTOLIC BLOOD PRESSURE: 67 MMHG | BODY MASS INDEX: 43.4 KG/M2

## 2023-04-19 DIAGNOSIS — M17.12 PRIMARY OSTEOARTHRITIS OF LEFT KNEE: Primary | ICD-10-CM

## 2023-04-19 PROCEDURE — 20610 LARGE JOINT ASPIRATION/INJECTION: L KNEE: ICD-10-PCS | Mod: LT,,, | Performed by: ORTHOPAEDIC SURGERY

## 2023-04-19 PROCEDURE — 99213 PR OFFICE/OUTPT VISIT, EST, LEVL III, 20-29 MIN: ICD-10-PCS | Mod: 25,,, | Performed by: ORTHOPAEDIC SURGERY

## 2023-04-19 PROCEDURE — 99213 OFFICE O/P EST LOW 20 MIN: CPT | Mod: 25,,, | Performed by: ORTHOPAEDIC SURGERY

## 2023-04-19 PROCEDURE — 20610 DRAIN/INJ JOINT/BURSA W/O US: CPT | Mod: LT,,, | Performed by: ORTHOPAEDIC SURGERY

## 2023-04-19 RX ORDER — LIDOCAINE HYDROCHLORIDE 20 MG/ML
3 INJECTION, SOLUTION INFILTRATION; PERINEURAL
Status: DISCONTINUED | OUTPATIENT
Start: 2023-04-19 | End: 2023-04-19 | Stop reason: HOSPADM

## 2023-04-19 RX ORDER — BETAMETHASONE SODIUM PHOSPHATE AND BETAMETHASONE ACETATE 3; 3 MG/ML; MG/ML
12 INJECTION, SUSPENSION INTRA-ARTICULAR; INTRALESIONAL; INTRAMUSCULAR; SOFT TISSUE
Status: DISCONTINUED | OUTPATIENT
Start: 2023-04-19 | End: 2023-04-19 | Stop reason: HOSPADM

## 2023-04-19 RX ADMIN — LIDOCAINE HYDROCHLORIDE 3 MG: 20 INJECTION, SOLUTION INFILTRATION; PERINEURAL at 02:04

## 2023-04-19 RX ADMIN — BETAMETHASONE SODIUM PHOSPHATE AND BETAMETHASONE ACETATE 12 MG: 3; 3 INJECTION, SUSPENSION INTRA-ARTICULAR; INTRALESIONAL; INTRAMUSCULAR; SOFT TISSUE at 02:04

## 2023-04-19 NOTE — PROCEDURES
Large Joint Aspiration/Injection: L knee    Date/Time: 4/19/2023 2:30 PM  Performed by: Nahun Schneider MD  Authorized by: Nahun Schneider MD     Consent Done?:  Yes (Verbal)  Indications:  Pain  Site marked: the procedure site was marked    Prep: patient was prepped and draped in usual sterile fashion    Approach:  Anterolateral  Location:  Knee  Site:  L knee  Medications:  12 mg betamethasone acetate-betamethasone sodium phosphate 6 mg/mL; 3 mg LIDOcaine HCL 20 mg/ml (2%) 20 mg/mL (2 %)  Patient tolerance:  Patient tolerated the procedure well with no immediate complications

## 2023-04-19 NOTE — PROGRESS NOTES
"Subjective:    CC: Follow-up, Pain, and Injections of the Left Knee (Lt knee follow had cortisone in January which did help and here today for another cortisone injection. Using a cane to ambulate. Taking OTC which don't help. Home exercises.)       HPI:  Patient returns today for repeat exam.  Patient continues to have intermittent knee pain, she does have a history of longstanding arthritis.  He is over 3 months from her previous injection.    ROS: Refer to HPI for pertinent ROS. All other 12 point systems negative.    Objective:  Vitals:    04/19/23 1429   BP: 125/67   BP Location: Left arm   Patient Position: Sitting   BP Method: Large (Automatic)   Pulse: 67   Temp: 97.9 °F (36.6 °C)   Weight: (!) 174.2 kg (384 lb)   Height: 5' 9" (1.753 m)        Physical Exam:  The patient is well-nourished, well-developed and in no apparent distress, pleasant and cooperative. Examination of the left lower extremity compartments are soft and warm. Skin is intact. There are no signs or symptoms of DVT or infection. There is no obvious joint effusion. There is no erythema. Tender to palpation along the medial and lateral joint line , left knee range of motion is 0-95. The knee is stable to exam with varus and valgus stressing. Negative anterior and posterior drawer. Negative Lachman´s.  Negative Suzanne's test. Patella grind is positive, Negative for apprehension. Neurovascularly intact distally.    Images: . Images Reviewed and discussed with patient.    Assessment:  1. Primary osteoarthritis of left knee  - Large Joint Aspiration/Injection: L knee        Plan:  At this time we discussed her physical exam and previous imaging findings.  She tolerated her steroid injection very well to the left knee.  I believe she is at [facility]MI for her left knee.  She will continue low-impact activities.  I would like see him back with any problems or difficulties.    Follow UP: No follow-ups on file.    Large Joint Aspiration/Injection: L " knee    Date/Time: 4/19/2023 2:30 PM  Performed by: Nahun Schneider MD  Authorized by: Nahun Schneider MD     Consent Done?:  Yes (Verbal)  Indications:  Pain  Site marked: the procedure site was marked    Prep: patient was prepped and draped in usual sterile fashion    Approach:  Anterolateral  Location:  Knee  Site:  L knee  Medications:  12 mg betamethasone acetate-betamethasone sodium phosphate 6 mg/mL; 3 mg LIDOcaine HCL 20 mg/ml (2%) 20 mg/mL (2 %)  Patient tolerance:  Patient tolerated the procedure well with no immediate complications

## 2023-08-04 DIAGNOSIS — Z12.31 ENCOUNTER FOR SCREENING MAMMOGRAM FOR MALIGNANT NEOPLASM OF BREAST: Primary | ICD-10-CM

## 2023-08-09 ENCOUNTER — HOSPITAL ENCOUNTER (OUTPATIENT)
Dept: RADIOLOGY | Facility: HOSPITAL | Age: 58
Discharge: HOME | End: 2023-08-09
Attending: INTERNAL MEDICINE
Payer: COMMERCIAL

## 2023-08-09 DIAGNOSIS — Z12.31 ENCOUNTER FOR SCREENING MAMMOGRAM FOR MALIGNANT NEOPLASM OF BREAST: ICD-10-CM

## 2023-08-09 PROCEDURE — 77067 SCR MAMMO BI INCL CAD: CPT | Mod: TC

## 2023-08-09 PROCEDURE — 77067 SCR MAMMO BI INCL CAD: CPT | Mod: 26,,, | Performed by: RADIOLOGY

## 2023-08-09 PROCEDURE — 77063 BREAST TOMOSYNTHESIS BI: CPT | Mod: 26,,, | Performed by: RADIOLOGY

## 2023-08-09 PROCEDURE — 77067 MAMMO DIGITAL SCREENING BILAT WITH TOMO: ICD-10-PCS | Mod: 26,,, | Performed by: RADIOLOGY

## 2023-08-09 PROCEDURE — 77063 MAMMO DIGITAL SCREENING BILAT WITH TOMO: ICD-10-PCS | Mod: 26,,, | Performed by: RADIOLOGY

## 2023-09-06 ENCOUNTER — LAB VISIT (OUTPATIENT)
Dept: LAB | Facility: HOSPITAL | Age: 58
End: 2023-09-06
Attending: INTERNAL MEDICINE
Payer: COMMERCIAL

## 2023-09-06 DIAGNOSIS — I10 ESSENTIAL HYPERTENSION, MALIGNANT: ICD-10-CM

## 2023-09-06 DIAGNOSIS — E78.5 HYPERLIPIDEMIA, UNSPECIFIED HYPERLIPIDEMIA TYPE: ICD-10-CM

## 2023-09-06 DIAGNOSIS — Z51.81 ENCOUNTER FOR THERAPEUTIC DRUG MONITORING: ICD-10-CM

## 2023-09-06 DIAGNOSIS — Z13.21 SCREENING FOR MALNUTRITION: ICD-10-CM

## 2023-09-06 DIAGNOSIS — E11.9 DIABETES MELLITUS WITHOUT COMPLICATION: Primary | ICD-10-CM

## 2023-09-06 DIAGNOSIS — E55.9 AVITAMINOSIS D: ICD-10-CM

## 2023-09-06 LAB
ALBUMIN SERPL-MCNC: 3.8 G/DL (ref 3.5–5)
ALBUMIN/GLOB SERPL: 1.1 RATIO (ref 1.1–2)
ALP SERPL-CCNC: 83 UNIT/L (ref 40–150)
ALT SERPL-CCNC: 13 UNIT/L (ref 0–55)
AST SERPL-CCNC: 17 UNIT/L (ref 5–34)
BASOPHILS # BLD AUTO: 0.03 X10(3)/MCL
BASOPHILS NFR BLD AUTO: 0.6 %
BILIRUB SERPL-MCNC: 0.5 MG/DL
BUN SERPL-MCNC: 15.9 MG/DL (ref 9.8–20.1)
CALCIUM SERPL-MCNC: 9.3 MG/DL (ref 8.4–10.2)
CHLORIDE SERPL-SCNC: 103 MMOL/L (ref 98–107)
CHOLEST SERPL-MCNC: 199 MG/DL
CHOLEST/HDLC SERPL: 3 {RATIO} (ref 0–5)
CO2 SERPL-SCNC: 30 MMOL/L (ref 22–29)
CREAT SERPL-MCNC: 0.66 MG/DL (ref 0.55–1.02)
DEPRECATED CALCIDIOL+CALCIFEROL SERPL-MC: 83.9 NG/ML (ref 30–80)
EOSINOPHIL # BLD AUTO: 0.13 X10(3)/MCL (ref 0–0.9)
EOSINOPHIL NFR BLD AUTO: 2.5 %
ERYTHROCYTE [DISTWIDTH] IN BLOOD BY AUTOMATED COUNT: 15.7 % (ref 11.5–17)
EST. AVERAGE GLUCOSE BLD GHB EST-MCNC: 116.9 MG/DL
GFR SERPLBLD CREATININE-BSD FMLA CKD-EPI: >60 MLS/MIN/1.73/M2
GLOBULIN SER-MCNC: 3.5 GM/DL (ref 2.4–3.5)
GLUCOSE SERPL-MCNC: 90 MG/DL (ref 74–100)
HBA1C MFR BLD: 5.7 %
HCT VFR BLD AUTO: 47 % (ref 37–47)
HDLC SERPL-MCNC: 59 MG/DL (ref 35–60)
HGB BLD-MCNC: 13.8 G/DL (ref 12–16)
IMM GRANULOCYTES # BLD AUTO: 0 X10(3)/MCL (ref 0–0.04)
IMM GRANULOCYTES NFR BLD AUTO: 0 %
LDLC SERPL CALC-MCNC: 128 MG/DL (ref 50–140)
LYMPHOCYTES # BLD AUTO: 1.9 X10(3)/MCL (ref 0.6–4.6)
LYMPHOCYTES NFR BLD AUTO: 36.5 %
MCH RBC QN AUTO: 24.9 PG (ref 27–31)
MCHC RBC AUTO-ENTMCNC: 29.4 G/DL (ref 33–36)
MCV RBC AUTO: 84.7 FL (ref 80–94)
MONOCYTES # BLD AUTO: 0.35 X10(3)/MCL (ref 0.1–1.3)
MONOCYTES NFR BLD AUTO: 6.7 %
NEUTROPHILS # BLD AUTO: 2.79 X10(3)/MCL (ref 2.1–9.2)
NEUTROPHILS NFR BLD AUTO: 53.7 %
PLATELET # BLD AUTO: 174 X10(3)/MCL (ref 130–400)
PMV BLD AUTO: 11.9 FL (ref 7.4–10.4)
POTASSIUM SERPL-SCNC: 3.8 MMOL/L (ref 3.5–5.1)
PROT SERPL-MCNC: 7.3 GM/DL (ref 6.4–8.3)
RBC # BLD AUTO: 5.55 X10(6)/MCL (ref 4.2–5.4)
SODIUM SERPL-SCNC: 142 MMOL/L (ref 136–145)
TRIGL SERPL-MCNC: 58 MG/DL (ref 37–140)
VLDLC SERPL CALC-MCNC: 12 MG/DL
WBC # SPEC AUTO: 5.2 X10(3)/MCL (ref 4.5–11.5)

## 2023-09-06 PROCEDURE — 80053 COMPREHEN METABOLIC PANEL: CPT

## 2023-09-06 PROCEDURE — 82043 UR ALBUMIN QUANTITATIVE: CPT

## 2023-09-06 PROCEDURE — 36415 COLL VENOUS BLD VENIPUNCTURE: CPT

## 2023-09-06 PROCEDURE — 85025 COMPLETE CBC W/AUTO DIFF WBC: CPT

## 2023-09-06 PROCEDURE — 82306 VITAMIN D 25 HYDROXY: CPT

## 2023-09-06 PROCEDURE — 83036 HEMOGLOBIN GLYCOSYLATED A1C: CPT

## 2023-09-06 PROCEDURE — 80061 LIPID PANEL: CPT

## 2023-09-07 LAB — MICROALBUMIN UR-MCNC: <5 UG/ML

## 2024-04-03 ENCOUNTER — APPOINTMENT (OUTPATIENT)
Dept: LAB | Facility: HOSPITAL | Age: 59
End: 2024-04-03
Attending: INTERNAL MEDICINE
Payer: COMMERCIAL

## 2024-04-03 DIAGNOSIS — R31.9 HEMATURIA, UNSPECIFIED TYPE: Primary | ICD-10-CM

## 2024-04-03 LAB
APPEARANCE UR: ABNORMAL
BACTERIA #/AREA URNS AUTO: ABNORMAL /HPF
BILIRUB UR QL STRIP.AUTO: NEGATIVE
COLOR UR AUTO: YELLOW
GLUCOSE UR QL STRIP.AUTO: >=1000
KETONES UR QL STRIP.AUTO: ABNORMAL
LEUKOCYTE ESTERASE UR QL STRIP.AUTO: NEGATIVE
MUCOUS THREADS URNS QL MICRO: ABNORMAL /LPF
NITRITE UR QL STRIP.AUTO: NEGATIVE
PH UR STRIP.AUTO: 6 [PH]
PROT UR QL STRIP.AUTO: 30
RBC #/AREA URNS AUTO: >=100 /HPF
RBC UR QL AUTO: ABNORMAL
SP GR UR STRIP.AUTO: 1.02 (ref 1–1.03)
SQUAMOUS #/AREA URNS AUTO: ABNORMAL /HPF
UROBILINOGEN UR STRIP-ACNC: 1
WBC #/AREA URNS AUTO: ABNORMAL /HPF

## 2024-04-03 PROCEDURE — 81003 URINALYSIS AUTO W/O SCOPE: CPT

## 2024-04-03 PROCEDURE — 87086 URINE CULTURE/COLONY COUNT: CPT

## 2024-04-06 ENCOUNTER — HOSPITAL ENCOUNTER (EMERGENCY)
Facility: HOSPITAL | Age: 59
Discharge: HOME | End: 2024-04-06
Attending: STUDENT IN AN ORGANIZED HEALTH CARE EDUCATION/TRAINING PROGRAM
Payer: MEDICARE

## 2024-04-06 VITALS
OXYGEN SATURATION: 96 % | TEMPERATURE: 98 F | SYSTOLIC BLOOD PRESSURE: 144 MMHG | RESPIRATION RATE: 18 BRPM | WEIGHT: 293 LBS | DIASTOLIC BLOOD PRESSURE: 84 MMHG | HEART RATE: 80 BPM | HEIGHT: 69 IN | BODY MASS INDEX: 43.4 KG/M2

## 2024-04-06 DIAGNOSIS — N30.01 ACUTE CYSTITIS WITH HEMATURIA: Primary | ICD-10-CM

## 2024-04-06 LAB
ALBUMIN SERPL-MCNC: 3.8 G/DL (ref 3.4–4.8)
ALBUMIN/GLOB SERPL: 0.9 RATIO (ref 1.1–2)
ALP SERPL-CCNC: 86 UNIT/L (ref 40–150)
ALT SERPL-CCNC: 10 UNIT/L (ref 0–55)
APPEARANCE UR: ABNORMAL
APTT PPP: 36.3 SECONDS (ref 23.2–33.7)
AST SERPL-CCNC: 16 UNIT/L (ref 5–34)
BACTERIA #/AREA URNS AUTO: ABNORMAL /HPF
BACTERIA UR CULT: NORMAL
BASOPHILS # BLD AUTO: 0.04 X10(3)/MCL
BASOPHILS NFR BLD AUTO: 0.6 %
BILIRUB SERPL-MCNC: 0.2 MG/DL
BILIRUB UR QL STRIP.AUTO: ABNORMAL
BUN SERPL-MCNC: 17.7 MG/DL (ref 9.8–20.1)
CALCIUM SERPL-MCNC: 10 MG/DL (ref 8.4–10.2)
CHLORIDE SERPL-SCNC: 103 MMOL/L (ref 98–107)
CO2 SERPL-SCNC: 31 MMOL/L (ref 23–31)
COLOR UR AUTO: ABNORMAL
CREAT SERPL-MCNC: 0.95 MG/DL (ref 0.55–1.02)
EOSINOPHIL # BLD AUTO: 0.12 X10(3)/MCL (ref 0–0.9)
EOSINOPHIL NFR BLD AUTO: 1.7 %
ERYTHROCYTE [DISTWIDTH] IN BLOOD BY AUTOMATED COUNT: 16.5 % (ref 11.5–17)
GFR SERPLBLD CREATININE-BSD FMLA CKD-EPI: >60 MLS/MIN/1.73/M2
GLOBULIN SER-MCNC: 4.1 GM/DL (ref 2.4–3.5)
GLUCOSE SERPL-MCNC: 111 MG/DL (ref 82–115)
GLUCOSE UR QL STRIP.AUTO: >=1000
HCT VFR BLD AUTO: 48.3 % (ref 37–47)
HGB BLD-MCNC: 14.2 G/DL (ref 12–16)
IMM GRANULOCYTES # BLD AUTO: 0.01 X10(3)/MCL (ref 0–0.04)
IMM GRANULOCYTES NFR BLD AUTO: 0.1 %
INR PPP: 1.2
KETONES UR QL STRIP.AUTO: 15
LEUKOCYTE ESTERASE UR QL STRIP.AUTO: ABNORMAL
LYMPHOCYTES # BLD AUTO: 2.11 X10(3)/MCL (ref 0.6–4.6)
LYMPHOCYTES NFR BLD AUTO: 30.7 %
MCH RBC QN AUTO: 25.4 PG (ref 27–31)
MCHC RBC AUTO-ENTMCNC: 29.4 G/DL (ref 33–36)
MCV RBC AUTO: 86.6 FL (ref 80–94)
MONOCYTES # BLD AUTO: 0.44 X10(3)/MCL (ref 0.1–1.3)
MONOCYTES NFR BLD AUTO: 6.4 %
NEUTROPHILS # BLD AUTO: 4.15 X10(3)/MCL (ref 2.1–9.2)
NEUTROPHILS NFR BLD AUTO: 60.5 %
NITRITE UR QL STRIP.AUTO: NEGATIVE
PH UR STRIP.AUTO: 5 [PH]
PLATELET # BLD AUTO: 184 X10(3)/MCL (ref 130–400)
PMV BLD AUTO: 11.8 FL (ref 7.4–10.4)
POTASSIUM SERPL-SCNC: 4.1 MMOL/L (ref 3.5–5.1)
PROT SERPL-MCNC: 7.9 GM/DL (ref 5.8–7.6)
PROT UR QL STRIP.AUTO: >=300
PROTHROMBIN TIME: 11.7 SECONDS (ref 12.5–14.5)
RBC # BLD AUTO: 5.58 X10(6)/MCL (ref 4.2–5.4)
RBC #/AREA URNS AUTO: >100 /HPF
RBC UR QL AUTO: ABNORMAL
SODIUM SERPL-SCNC: 142 MMOL/L (ref 136–145)
SP GR UR STRIP.AUTO: 1.01 (ref 1–1.03)
SQUAMOUS #/AREA URNS AUTO: ABNORMAL /HPF
UROBILINOGEN UR STRIP-ACNC: 4
WBC # SPEC AUTO: 6.87 X10(3)/MCL (ref 4.5–11.5)
WBC #/AREA URNS AUTO: ABNORMAL /HPF

## 2024-04-06 PROCEDURE — 25000003 PHARM REV CODE 250: Performed by: STUDENT IN AN ORGANIZED HEALTH CARE EDUCATION/TRAINING PROGRAM

## 2024-04-06 PROCEDURE — 99285 EMERGENCY DEPT VISIT HI MDM: CPT | Mod: 25

## 2024-04-06 PROCEDURE — 85730 THROMBOPLASTIN TIME PARTIAL: CPT | Performed by: STUDENT IN AN ORGANIZED HEALTH CARE EDUCATION/TRAINING PROGRAM

## 2024-04-06 PROCEDURE — 85610 PROTHROMBIN TIME: CPT | Performed by: STUDENT IN AN ORGANIZED HEALTH CARE EDUCATION/TRAINING PROGRAM

## 2024-04-06 PROCEDURE — 85025 COMPLETE CBC W/AUTO DIFF WBC: CPT | Performed by: STUDENT IN AN ORGANIZED HEALTH CARE EDUCATION/TRAINING PROGRAM

## 2024-04-06 PROCEDURE — 80053 COMPREHEN METABOLIC PANEL: CPT | Performed by: STUDENT IN AN ORGANIZED HEALTH CARE EDUCATION/TRAINING PROGRAM

## 2024-04-06 PROCEDURE — 96372 THER/PROPH/DIAG INJ SC/IM: CPT | Performed by: STUDENT IN AN ORGANIZED HEALTH CARE EDUCATION/TRAINING PROGRAM

## 2024-04-06 PROCEDURE — 81003 URINALYSIS AUTO W/O SCOPE: CPT | Performed by: STUDENT IN AN ORGANIZED HEALTH CARE EDUCATION/TRAINING PROGRAM

## 2024-04-06 PROCEDURE — 63600175 PHARM REV CODE 636 W HCPCS: Performed by: STUDENT IN AN ORGANIZED HEALTH CARE EDUCATION/TRAINING PROGRAM

## 2024-04-06 RX ORDER — CEFTRIAXONE 1 G/1
1 INJECTION, POWDER, FOR SOLUTION INTRAMUSCULAR; INTRAVENOUS
Status: COMPLETED | OUTPATIENT
Start: 2024-04-06 | End: 2024-04-06

## 2024-04-06 RX ORDER — NITROFURANTOIN 25; 75 MG/1; MG/1
100 CAPSULE ORAL 2 TIMES DAILY
Qty: 14 CAPSULE | Refills: 0 | Status: SHIPPED | OUTPATIENT
Start: 2024-04-06 | End: 2024-04-13

## 2024-04-06 RX ORDER — LIDOCAINE HYDROCHLORIDE 10 MG/ML
2.1 INJECTION, SOLUTION EPIDURAL; INFILTRATION; INTRACAUDAL; PERINEURAL
Status: COMPLETED | OUTPATIENT
Start: 2024-04-06 | End: 2024-04-06

## 2024-04-06 RX ADMIN — CEFTRIAXONE SODIUM 1 G: 1 INJECTION, POWDER, FOR SOLUTION INTRAMUSCULAR; INTRAVENOUS at 11:04

## 2024-04-06 RX ADMIN — LIDOCAINE HYDROCHLORIDE 21 MG: 10 INJECTION, SOLUTION EPIDURAL; INFILTRATION; INTRACAUDAL; PERINEURAL at 11:04

## 2024-04-07 NOTE — ED PROVIDER NOTES
Encounter Date: 4/6/2024       History     Chief Complaint   Patient presents with    Hematuria     Pt states she is passing blood when she urinates, started yesterday.  She is having right flank pain.  Is on xarelto.      Patient is a 59-year-old  female with a history of paroxysmal AFib, on anticoagulation, hypertension, prediabetes who presented to the ER today due to hematuria.  Patient states it started 2 weeks ago but it was mild.  Patient states she has been in touch with the primary care doctor who was planning for a repeat UA in 2 weeks' time.  She states she developed some right-sided flank pain recently and the hematuria seemed worsen.  She denies any dysuria or abdominal pain.  Denies any fevers, chills, cough, congestion, chest pain, shortness of breath.  She states that she has had no issues voiding.      Review of patient's allergies indicates:  No Known Allergies  Past Medical History:   Diagnosis Date    Atrial fibrillation     Diabetes mellitus, type 2     History of pericarditis     HLD (hyperlipidemia)     HTN (hypertension)     Obesity     Sleep apnea      Past Surgical History:   Procedure Laterality Date    APPENDECTOMY      CARDIOVERSION  02/21/2017    CHOLECYSTECTOMY      FRACTURE SURGERY  05/26/2022    Lt arm    ORIF HUMERUS FRACTURE Left 02/16/2022    with bone graft  nonunion    TONSILLECTOMY      TOTAL ABDOMINAL HYSTERECTOMY W/ BILATERAL SALPINGOOPHORECTOMY N/A 12/07/2022    Procedure: HYSTERECTOMY, TOTAL, ABDOMINAL, WITH BILATERAL SALPINGO-OOPHORECTOMY;  Surgeon: Rancho Easley MD;  Location: [facility];  Service: OB/GYN;  Laterality: N/A;  ABNER / BSO //  (NO ROBOT)    TRANSESOPHAGEAL ECHOCARDIOGRAPHY  02/21/2017    VARICOSE VEIN STRIPPING  01/01/2019    Dilation of Left Common Iliac Vein   12/20/2017    Intramedullary Ananth Insertion Humerus  Left 05/27/2021    Pericardial window operation       Family History   Problem Relation Age of Onset    Diabetes Mother     Heart  failure Mother     Hypertension Mother     Heart failure Father     Hypertension Father     Cancer Father     No Known Problems Sister     No Known Problems Brother      Social History     Tobacco Use    Smoking status: Never    Smokeless tobacco: Never   Substance Use Topics    Alcohol use: Never    Drug use: Never     Review of Systems   Constitutional:  Negative for chills, fatigue and fever.   HENT:  Negative for congestion, sore throat and trouble swallowing.    Eyes:  Negative for pain and visual disturbance.   Respiratory:  Negative for cough, shortness of breath and wheezing.    Cardiovascular:  Negative for chest pain and palpitations.   Gastrointestinal:  Negative for abdominal pain, blood in stool, constipation, diarrhea, nausea and vomiting.   Genitourinary:  Positive for flank pain and hematuria. Negative for dysuria.   Musculoskeletal:  Negative for back pain and myalgias.   Skin:  Negative for rash and wound.   Neurological:  Negative for seizures, syncope and headaches.   Psychiatric/Behavioral:  Negative for confusion. The patient is not nervous/anxious.        Physical Exam     Initial Vitals [04/06/24 1947]   BP Pulse Resp Temp SpO2   (!) 144/84 80 18 98.2 °F (36.8 °C) 96 %      MAP       --         Physical Exam    Nursing note and vitals reviewed.  Constitutional: She appears well-developed and well-nourished. She is not diaphoretic. No distress.   HENT:   Head: Normocephalic.   Right Ear: External ear normal.   Left Ear: External ear normal.   Nose: Nose normal.   Eyes: Conjunctivae and EOM are normal. Right eye exhibits no discharge. Left eye exhibits no discharge. No scleral icterus.   Neck:   Normal range of motion.  Cardiovascular:  Normal rate, regular rhythm and normal heart sounds.     Exam reveals no gallop and no friction rub.       No murmur heard.  Pulmonary/Chest: Breath sounds normal. No stridor. No respiratory distress. She has no wheezes. She has no rhonchi. She has no rales.    Abdominal: Abdomen is soft. She exhibits no distension. There is no abdominal tenderness.   Negative CVA tenderness bilaterally. There is no rebound and no guarding.   Musculoskeletal:         General: Normal range of motion.      Cervical back: Normal range of motion.     Neurological: She is alert.   Skin: Skin is warm. No rash noted. No erythema.   Psychiatric: She has a normal mood and affect. Her behavior is normal.         ED Course   Procedures  Labs Reviewed   URINALYSIS, REFLEX TO URINE CULTURE - Abnormal; Notable for the following components:       Result Value    Color, UA Red (*)     Appearance, UA Turbid (*)     Protein, UA >=300 (*)     Glucose, UA >=1000 (*)     Ketones, UA 15 (*)     Blood, UA Large (*)     Bilirubin, UA Large (*)     Urobilinogen, UA 4.0 (*)     Leukocyte Esterase, UA Large (*)     All other components within normal limits   URINALYSIS, MICROSCOPIC - Abnormal; Notable for the following components:    Bacteria, UA Many (*)     RBC, UA >100 (*)     All other components within normal limits   COMPREHENSIVE METABOLIC PANEL - Abnormal; Notable for the following components:    Protein Total 7.9 (*)     Globulin 4.1 (*)     Albumin/Globulin Ratio 0.9 (*)     All other components within normal limits   PROTIME-INR - Abnormal; Notable for the following components:    PT 11.7 (*)     All other components within normal limits   APTT - Abnormal; Notable for the following components:    PTT 36.3 (*)     All other components within normal limits   CBC WITH DIFFERENTIAL - Abnormal; Notable for the following components:    RBC 5.58 (*)     Hct 48.3 (*)     MCH 25.4 (*)     MCHC 29.4 (*)     MPV 11.8 (*)     All other components within normal limits   CBC W/ AUTO DIFFERENTIAL    Narrative:     The following orders were created for panel order CBC Auto Differential.  Procedure                               Abnormality         Status                     ---------                                -----------         ------                     CBC with Differential[[id number]]       Abnormal            Final result                 Please view results for these tests on the individual orders.          Imaging Results              CT Renal Stone Study ABD Pelvis WO (Final result)  Result time 04/06/24 22:13:21      Final result by Wolf Harper MD (04/06/24 22:13:21)                   Impression:      Limited study.  Small fat containing umbilical hernia    Lesion of the midpole of the left kidney cannot be classified on this noncontrast exam      Electronically signed by: Wolf Harper MD  Date:    04/06/2024  Time:    22:13               Narrative:    EXAMINATION:  CT RENAL STONE STUDY ABD PELVIS WO    CLINICAL HISTORY:  Flank pain, kidney stone suspected;    TECHNIQUE:  Low dose axial images, sagittal and coronal reformations were obtained from the lung bases to the pubic symphysis.  Contrast was not administered.    Automatic exposure control (AEC) was utilized for dose reduction.    Dose: 1186.29 mGycm    COMPARISON:  None    FINDINGS:  There are mild atelectatic changes in the right lung base.  Liver appears normal.  Spleen appears normal.  Pancreas grossly appears normal.  The adrenals are not enlarged.  There is a lesion on the lateral aspect of the left kidney this cannot be well evaluated.  It measures 1.4 cm in is best seen on series 3, image 47.  No nephrolithiasis is seen bilaterally.  There is no hydronephrosis.  A distal ureteral lithiasis is not seen.  There is a fat containing umbilical hernia.  There is stents in the iliac veins bilaterally.  The appendix is not identified.                                       Medications   cefTRIAXone injection 1 g (has no administration in time range)   LIDOcaine (PF) 10 mg/ml (1%) injection 21 mg (has no administration in time range)     Medical Decision Making  Differentials:  Anticoagulation induced hematuria, nephritic syndrome, nephrotic syndrome,  UTI, nephrolithiasis   History in his the patient and her    59-year-old well-appearing female with stable vital signs presents with 2 week history of hematuria.  Denies any issues voiding.  Initially complained of some right sided possible flank pain but states that seems to have improved.  Urine concerning for UTI given the significant bacteria content and leukocytes.  Basic labs showed no leukocytosis no anemia.  Hemoglobin is stable with no signs of PILY on lab work.  CT stone protocol done which showed a lesion to her left kidney that will need follow up as well.  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  We had a long discussion about transfer for further workup versus treating the underlying UTI with outpatient workup and ER return precautions.  Patient was not want to be transferred would like to be treated for UTI.  Rocephin given here and Macrobid sent pharmacy.  Urine culture pending.  All questions answered in layman's terms and return precautions were discussed.    Amount and/or Complexity of Data Reviewed  Labs: ordered. Decision-making details documented in ED Course.  Radiology: ordered. Decision-making details documented in ED Course.    Risk  Prescription drug management.                                      Clinical Impression:  Final diagnoses:  [N30.01] Acute cystitis with hematuria (Primary)          ED Disposition Condition    Discharge Stable          ED Prescriptions       Medication Sig Dispense Start Date End Date Auth. Provider    nitrofurantoin, macrocrystal-monohydrate, (MACROBID) 100 MG capsule Take 1 capsule (100 mg total) by mouth 2 (two) times daily. for 7 days 14 capsule 4/6/2024 4/13/2024 Domenico Telles MD          Follow-up Information       Follow up With Specialties Details Why Contact [facility] [place] - Emergency Dept Emergency Medicine  If symptoms worsen [address] [facility]  [address]  [phone]    Jermaine Scott MD Internal Medicine Schedule an appointment as soon as possible for a visit   [address] [facility]  [address]  [place] [place] [address]  [phone]               Domenico Telles MD  04/06/24 6012

## 2024-04-16 ENCOUNTER — APPOINTMENT (OUTPATIENT)
Dept: LAB | Facility: HOSPITAL | Age: 59
End: 2024-04-16
Attending: INTERNAL MEDICINE
Payer: MEDICARE

## 2024-04-16 DIAGNOSIS — R31.9 HEMATURIA, UNSPECIFIED TYPE: Primary | ICD-10-CM

## 2024-04-16 LAB
APPEARANCE UR: CLEAR
BACTERIA #/AREA URNS AUTO: ABNORMAL /HPF
BILIRUB UR QL STRIP.AUTO: NEGATIVE
COLOR UR AUTO: YELLOW
GLUCOSE UR QL STRIP.AUTO: 500
KETONES UR QL STRIP.AUTO: NEGATIVE
LEUKOCYTE ESTERASE UR QL STRIP.AUTO: NEGATIVE
NITRITE UR QL STRIP.AUTO: NEGATIVE
PH UR STRIP.AUTO: 6 [PH]
PROT UR QL STRIP.AUTO: NEGATIVE
RBC #/AREA URNS AUTO: ABNORMAL /HPF
RBC UR QL AUTO: NEGATIVE
SP GR UR STRIP.AUTO: 1.02 (ref 1–1.03)
SQUAMOUS #/AREA URNS AUTO: ABNORMAL /HPF
UROBILINOGEN UR STRIP-ACNC: 0.2
WBC #/AREA URNS AUTO: ABNORMAL /HPF

## 2024-04-16 PROCEDURE — 81001 URINALYSIS AUTO W/SCOPE: CPT

## 2024-05-10 DIAGNOSIS — Z85.528 PERSONAL HISTORY OF RENAL CANCER: Primary | ICD-10-CM

## 2024-05-15 ENCOUNTER — HOSPITAL ENCOUNTER (OUTPATIENT)
Dept: RADIOLOGY | Facility: HOSPITAL | Age: 59
Discharge: HOME | End: 2024-05-15
Attending: INTERNAL MEDICINE
Payer: MEDICARE

## 2024-05-15 DIAGNOSIS — Z85.528 PERSONAL HISTORY OF RENAL CANCER: ICD-10-CM

## 2024-05-15 PROCEDURE — 25500020 PHARM REV CODE 255: Performed by: INTERNAL MEDICINE

## 2024-05-15 PROCEDURE — 74170 CT ABD WO CNTRST FLWD CNTRST: CPT | Mod: TC

## 2024-05-15 RX ADMIN — IOHEXOL 100 ML: 350 INJECTION, SOLUTION INTRAVENOUS at 01:05

## 2024-08-13 ENCOUNTER — HOSPITAL ENCOUNTER (OUTPATIENT)
Dept: RADIOLOGY | Facility: HOSPITAL | Age: 59
Discharge: HOME | End: 2024-08-13
Attending: INTERNAL MEDICINE
Payer: MEDICARE

## 2024-08-13 DIAGNOSIS — Z12.31 ENCOUNTER FOR SCREENING MAMMOGRAM FOR MALIGNANT NEOPLASM OF BREAST: ICD-10-CM

## 2024-08-13 PROCEDURE — 77063 BREAST TOMOSYNTHESIS BI: CPT | Mod: TC

## 2024-08-13 PROCEDURE — 77063 BREAST TOMOSYNTHESIS BI: CPT | Mod: 26,,, | Performed by: RADIOLOGY

## 2024-08-13 PROCEDURE — 77067 SCR MAMMO BI INCL CAD: CPT | Mod: 26,,, | Performed by: RADIOLOGY

## 2024-09-12 DIAGNOSIS — D41.02 NEOPLASM OF UNCERTAIN BEHAVIOR OF LEFT KIDNEY: Primary | ICD-10-CM

## 2024-12-08 ENCOUNTER — HOSPITAL ENCOUNTER (EMERGENCY)
Facility: HOSPITAL | Age: 59
Discharge: HOME | End: 2024-12-08
Attending: STUDENT IN AN ORGANIZED HEALTH CARE EDUCATION/TRAINING PROGRAM
Payer: MEDICARE

## 2024-12-08 VITALS
OXYGEN SATURATION: 98 % | DIASTOLIC BLOOD PRESSURE: 89 MMHG | HEART RATE: 77 BPM | HEIGHT: 69 IN | TEMPERATURE: 98 F | RESPIRATION RATE: 18 BRPM | BODY MASS INDEX: 43.4 KG/M2 | WEIGHT: 293 LBS | SYSTOLIC BLOOD PRESSURE: 150 MMHG

## 2024-12-08 DIAGNOSIS — K92.1 BLOOD IN STOOL: Primary | ICD-10-CM

## 2024-12-08 LAB
ALBUMIN SERPL-MCNC: 3.9 G/DL (ref 3.4–4.8)
ALBUMIN/GLOB SERPL: 1 RATIO (ref 1.1–2)
ALP SERPL-CCNC: 86 UNIT/L (ref 40–150)
ALT SERPL-CCNC: 12 UNIT/L (ref 0–55)
ANION GAP SERPL CALC-SCNC: 13 MEQ/L
APTT PPP: 43.5 SECONDS (ref 23.2–33.7)
AST SERPL-CCNC: 15 UNIT/L (ref 5–34)
BASOPHILS # BLD AUTO: 0.02 X10(3)/MCL
BASOPHILS NFR BLD AUTO: 0.4 %
BILIRUB SERPL-MCNC: 0.3 MG/DL
BUN SERPL-MCNC: 17.2 MG/DL (ref 9.8–20.1)
CALCIUM SERPL-MCNC: 9.4 MG/DL (ref 8.4–10.2)
CHLORIDE SERPL-SCNC: 101 MMOL/L (ref 98–107)
CO2 SERPL-SCNC: 30 MMOL/L (ref 23–31)
COLOR STL: ABNORMAL
CONSISTENCY STL: ABNORMAL
CREAT SERPL-MCNC: 0.89 MG/DL (ref 0.55–1.02)
CREAT/UREA NIT SERPL: 19
EOSINOPHIL # BLD AUTO: 0.11 X10(3)/MCL (ref 0–0.9)
EOSINOPHIL NFR BLD AUTO: 2.1 %
ERYTHROCYTE [DISTWIDTH] IN BLOOD BY AUTOMATED COUNT: 15.8 % (ref 11.5–17)
GFR SERPLBLD CREATININE-BSD FMLA CKD-EPI: >60 ML/MIN/1.73/M2
GLOBULIN SER-MCNC: 3.9 GM/DL (ref 2.4–3.5)
GLUCOSE SERPL-MCNC: 102 MG/DL (ref 82–115)
HCT VFR BLD AUTO: 36.8 % (ref 37–47)
HEMOCCULT SP1 STL QL: POSITIVE
HGB BLD-MCNC: 11.1 G/DL (ref 12–16)
IMM GRANULOCYTES # BLD AUTO: 0.01 X10(3)/MCL (ref 0–0.04)
IMM GRANULOCYTES NFR BLD AUTO: 0.2 %
INR PPP: 2
LYMPHOCYTES # BLD AUTO: 1.55 X10(3)/MCL (ref 0.6–4.6)
LYMPHOCYTES NFR BLD AUTO: 29.9 %
MCH RBC QN AUTO: 26.1 PG (ref 27–31)
MCHC RBC AUTO-ENTMCNC: 30.2 G/DL (ref 33–36)
MCV RBC AUTO: 86.4 FL (ref 80–94)
MONOCYTES # BLD AUTO: 0.42 X10(3)/MCL (ref 0.1–1.3)
MONOCYTES NFR BLD AUTO: 8.1 %
NEUTROPHILS # BLD AUTO: 3.08 X10(3)/MCL (ref 2.1–9.2)
NEUTROPHILS NFR BLD AUTO: 59.3 %
PLATELET # BLD AUTO: 142 X10(3)/MCL (ref 130–400)
PMV BLD AUTO: 11.1 FL (ref 7.4–10.4)
POTASSIUM SERPL-SCNC: 3.5 MMOL/L (ref 3.5–5.1)
PROT SERPL-MCNC: 7.8 GM/DL (ref 5.8–7.6)
PROTHROMBIN TIME: 19.3 SECONDS (ref 12.5–14.5)
RBC # BLD AUTO: 4.26 X10(6)/MCL (ref 4.2–5.4)
SODIUM SERPL-SCNC: 144 MMOL/L (ref 136–145)
WBC # BLD AUTO: 5.19 X10(3)/MCL (ref 4.5–11.5)

## 2024-12-08 PROCEDURE — 82272 OCCULT BLD FECES 1-3 TESTS: CPT | Performed by: STUDENT IN AN ORGANIZED HEALTH CARE EDUCATION/TRAINING PROGRAM

## 2024-12-08 PROCEDURE — 85610 PROTHROMBIN TIME: CPT | Performed by: STUDENT IN AN ORGANIZED HEALTH CARE EDUCATION/TRAINING PROGRAM

## 2024-12-08 PROCEDURE — 85730 THROMBOPLASTIN TIME PARTIAL: CPT | Performed by: STUDENT IN AN ORGANIZED HEALTH CARE EDUCATION/TRAINING PROGRAM

## 2024-12-08 PROCEDURE — 25500020 PHARM REV CODE 255: Performed by: STUDENT IN AN ORGANIZED HEALTH CARE EDUCATION/TRAINING PROGRAM

## 2024-12-08 PROCEDURE — 99285 EMERGENCY DEPT VISIT HI MDM: CPT | Mod: 25

## 2024-12-08 PROCEDURE — 80053 COMPREHEN METABOLIC PANEL: CPT | Performed by: STUDENT IN AN ORGANIZED HEALTH CARE EDUCATION/TRAINING PROGRAM

## 2024-12-08 PROCEDURE — 85025 COMPLETE CBC W/AUTO DIFF WBC: CPT | Performed by: STUDENT IN AN ORGANIZED HEALTH CARE EDUCATION/TRAINING PROGRAM

## 2024-12-08 RX ADMIN — IOHEXOL 100 ML: 350 INJECTION, SOLUTION INTRAVENOUS at 01:12

## 2024-12-08 NOTE — DISCHARGE INSTRUCTIONS
New Results - Labs    Updated   Order    12/08/24 0123  APTT  Collected: 12/08/24 0054  Final result  Specimen: Blood     PTT 43.5 High  seconds           12/08/24 0123  Protime-INR  Collected: 12/08/24 0054  Final result  Specimen: Blood     PT 19.3 High  seconds   INR 2.0 High           12/08/24 0121  Comprehensive metabolic panel  Collected: 12/08/24 0054  Final result  Specimen: Blood     Sodium 144 mmol/L   Potassium 3.5 mmol/L   Chloride 101 mmol/L   CO2 30 mmol/L   Glucose 102 mg/dL   Blood Urea Nitrogen 17.2 mg/dL   Creatinine 0.89 mg/dL   Calcium 9.4 mg/dL   Protein Total 7.8 High  gm/dL   Albumin 3.9 g/dL   Globulin 3.9 High  gm/dL   Albumin/Globulin Ratio 1.0 Low  ratio   Bilirubin Total 0.3 mg/dL   ALP 86 unit/L   ALT 12 unit/L   AST 15 unit/L   eGFR >60 mL/min/1.73/m2   Anion Gap 13.0 mEq/L   BUN/Creatinine Ratio 19          12/08/24 0059  CBC auto differential  Collected: 12/08/24 0054  Final result  Specimen: Blood            12/08/24 0059  CBC with Differential  Collected: 12/08/24 0054  Final result  Specimen: Blood     WBC 5.19 x10(3)/mcL   RBC 4.26 x10(6)/mcL   Hgb 11.1 Low  g/dL   Hct 36.8 Low  %   MCV 86.4 fL   MCH 26.1 Low  pg   MCHC 30.2 Low  g/dL   RDW 15.8 %   Platelet 142 x10(3)/mcL   MPV 11.1 High  fL   Neut % 59.3 %   Lymph % 29.9 %   Mono % 8.1 %   Eos % 2.1 %   Basophil % 0.4 %   Lymph # 1.55 x10(3)/mcL   Neut # 3.08 x10(3)/mcL   Mono # 0.42 x10(3)/mcL   Eos # 0.11 x10(3)/mcL   Baso # 0.02 x10(3)/mcL   IG# 0.01 x10(3)/mcL   IG% 0.2 %          12/08/24 0110  Occult Blood, Stool 1st Specimen  Collected: 12/08/24 0045  Final result  Specimen: Stool     Stool Color 1 Brown   Stool Consistancy 1 formed   Occult Blood Stool 1 Positive Abnormal

## 2024-12-08 NOTE — ED PROVIDER NOTES
Encounter Date: 12/8/2024       History     Chief Complaint   Patient presents with    Rectal Bleeding     Since Thursday noticed red blood in her stool today saw some clots. On Xarelto.      HPI  Patient is a 59-year-old female past medical history atrial fibrillation, on Xarelto, diabetes, hyperlipidemia, hypertension, who presents to ER complaining of episode of blood mixed in his stool.  Patient states symptoms has been ongoing intermittently since Thursday.  Denies any chest pain, shortness breath, weakness, nausea, syncope.  Patient states last colonoscopy a approximately 5 years ago.  Review of patient's allergies indicates:  No Known Allergies  Past Medical History:   Diagnosis Date    Atrial fibrillation     Cancer of kidney     had radiation    Diabetes mellitus, type 2     History of pericarditis     HLD (hyperlipidemia)     HTN (hypertension)     Obesity     Sleep apnea      Past Surgical History:   Procedure Laterality Date    APPENDECTOMY      CARDIOVERSION  02/21/2017    CHOLECYSTECTOMY      FRACTURE SURGERY  05/26/2022    Lt arm    ORIF HUMERUS FRACTURE Left 02/16/2022    with bone graft  nonunion    TONSILLECTOMY      TOTAL ABDOMINAL HYSTERECTOMY W/ BILATERAL SALPINGOOPHORECTOMY N/A 12/07/2022    Procedure: HYSTERECTOMY, TOTAL, ABDOMINAL, WITH BILATERAL SALPINGO-OOPHORECTOMY;  Surgeon: Rancho Easley MD;  Location: [facility];  Service: OB/GYN;  Laterality: N/A;  ABNER / BSO //  (NO ROBOT)    TRANSESOPHAGEAL ECHOCARDIOGRAPHY  02/21/2017    VARICOSE VEIN STRIPPING  01/01/2019    Dilation of Left Common Iliac Vein   12/20/2017    Intramedullary Ananth Insertion Humerus  Left 05/27/2021    Pericardial window operation       Family History   Problem Relation Name Age of Onset    Diabetes Mother Renella     Heart failure Mother Renella     Hypertension Mother Renella     Heart failure Father Brewster     Hypertension Father Brewster     Cancer Father Brewster     No Known Problems Sister      No Known Problems  Brother       Social History     Tobacco Use    Smoking status: Never    Smokeless tobacco: Never   Substance Use Topics    Alcohol use: Never    Drug use: Never     Review of Systems   Constitutional:  Negative for fever.   HENT:  Negative for sore throat.    Respiratory:  Negative for shortness of breath.    Cardiovascular:  Negative for chest pain.   Gastrointestinal:  Positive for blood in stool. Negative for nausea.   Genitourinary:  Negative for dysuria.   Musculoskeletal:  Negative for back pain.   Skin:  Negative for rash.   Neurological:  Negative for weakness.   Hematological:  Does not bruise/bleed easily.   All other systems reviewed and are negative.      Physical Exam     Initial Vitals [12/08/24 0029]   BP Pulse Resp Temp SpO2   (!) 150/89 77 18 98 °F (36.7 °C) 98 %      MAP       --         Physical Exam    Nursing note and vitals reviewed.  Constitutional: Vital signs are normal. She appears well-developed and well-nourished. She is not diaphoretic. She is active.  Non-toxic appearance. She does not appear ill. No distress.   HENT:   Head: Normocephalic and atraumatic.   Eyes: Conjunctivae are normal. Pupils are equal, round, and reactive to light. Right conjunctiva is not injected. Left conjunctiva is not injected.   Neck: Trachea normal. Neck supple.   Normal range of motion.   Full passive range of motion without pain.     Cardiovascular:  Normal rate, regular rhythm, S1 normal, S2 normal, intact distal pulses and normal pulses.           Pulmonary/Chest: Breath sounds normal. No respiratory distress. She has no wheezes.   Abdominal: Abdomen is soft. Bowel sounds are normal. There is no abdominal tenderness.   Abdomen is soft, nontender, no rebound or guarding.  Normoactive bowel sounds.   Genitourinary:    Genitourinary Comments: Chaperoned rectal exam negative for any gross blood on gloved finger.     Musculoskeletal:      Cervical back: Full passive range of motion without pain, normal range  of motion and neck supple. No rigidity.      Right lower leg: No swelling. No edema.      Left lower leg: No swelling. No edema.     Neurological: She is alert and oriented to person, place, and time.   Skin: Skin is warm and dry. Capillary refill takes less than 2 seconds.         ED Course   Procedures  Labs Reviewed   COMPREHENSIVE METABOLIC PANEL - Abnormal       Result Value    Sodium 144      Potassium 3.5      Chloride 101      CO2 30      Glucose 102      Blood Urea Nitrogen 17.2      Creatinine 0.89      Calcium 9.4      Protein Total 7.8 (*)     Albumin 3.9      Globulin 3.9 (*)     Albumin/Globulin Ratio 1.0 (*)     Bilirubin Total 0.3      ALP 86      ALT 12      AST 15      eGFR >60      Anion Gap 13.0      BUN/Creatinine Ratio 19     CBC WITH DIFFERENTIAL - Abnormal    WBC 5.19      RBC 4.26      Hgb 11.1 (*)     Hct 36.8 (*)     MCV 86.4      MCH 26.1 (*)     MCHC 30.2 (*)     RDW 15.8      Platelet 142      MPV 11.1 (*)     Neut % 59.3      Lymph % 29.9      Mono % 8.1      Eos % 2.1      Basophil % 0.4      Lymph # 1.55      Neut # 3.08      Mono # 0.42      Eos # 0.11      Baso # 0.02      IG# 0.01      IG% 0.2     PROTIME-INR - Abnormal    PT 19.3 (*)     INR 2.0 (*)    APTT - Abnormal    PTT 43.5 (*)    OCCULT BLOOD, STOOL 1ST SPECIMEN - Abnormal    Stool Color 1 Brown      Stool Consistancy 1 formed      Occult Blood Stool 1 Positive (*)    CBC W/ AUTO DIFFERENTIAL    Narrative:     The following orders were created for panel order CBC auto differential.  Procedure                               Abnormality         Status                     ---------                               -----------         ------                     CBC with Differential[[id number]]       Abnormal            Final result                 Please view results for these tests on the individual orders.                 Medications   iohexoL (OMNIPAQUE 350) injection 100 mL (100 mLs Intravenous Given 12/8/24 0136)     Medical  Decision Making  Patient is a 59-year-old female past medical history atrial fibrillation, on Xarelto, diabetes, hyperlipidemia, hypertension, who presents to ER complaining of episode of blood mixed in his stool.    Differential diagnosis includes but not limited to:  Diverticulosis, diverticulitis, colon cancer, medication side-effect, anemia     Patient vitals on arrival were stable.  Physical exam noted no gross blood on gloved finger on rectal exam.  Abdomen is soft, nontender, no rebound or guarding.  Screening laboratories obtained including coags.  Labs returned negative for any bertrand anemia with hemoglobin of 11.1.  However this is down approximately 3 points from patient's last hemoglobin here which noted hemoglobin of 14.2.  No gross electrolyte derangements, no PILY.  CT abdomen pelvis returned, negative for any acute abnormalities.  Occult stool was positive.  As patient's vitals were stable, and she is not severely anemic, see no indication for further workup at this time.  She has been counseled to monitor her symptoms, over the next few days and return to ED at any point in time if symptoms worsen, change.  Recommended patient follow with her PCP, gastroenterologist for repeat colonoscopy.  Patient otherwise stable for discharge.  Strict return precautions given if symptoms worsen, change return to ER.  Patient stable for discharge, going home with this is still who was at the bedside.      Lavon Da Silva M.D.  Emergency Medicine        Amount and/or Complexity of Data Reviewed  Labs: ordered.  Radiology: ordered.    Risk  Prescription drug management.                                      Clinical Impression:  Final diagnoses:  [K92.1] Blood in stool (Primary)          ED Disposition Condition    Discharge           ED Prescriptions    None       Follow-up Information       Follow up With Specialties Details Why Contact Info    Jermaine Scott MD Internal Medicine Schedule an appointment as soon as  possible for a visit in 2 days For follow up [address]  [address]  [facility] [address]  [phone]      Your gastroenterologist  In 3 days For follow up     [facility] - Emergency Dept Emergency Medicine  If symptoms worsen [address] [facility] [id number] [phone]             Lavon Da Silva MD  12/08/24 0441

## 2025-02-10 ENCOUNTER — PATIENT MESSAGE (OUTPATIENT)
Dept: ADMINISTRATIVE | Facility: OTHER | Age: 60
End: 2025-02-10
Payer: COMMERCIAL

## 2025-02-12 ENCOUNTER — HOSPITAL ENCOUNTER (OUTPATIENT)
Facility: HOSPITAL | Age: 60
Discharge: HOME | End: 2025-02-12
Attending: INTERNAL MEDICINE | Admitting: INTERNAL MEDICINE
Payer: MEDICARE

## 2025-02-12 ENCOUNTER — ANESTHESIA (OUTPATIENT)
Dept: ENDOSCOPY | Facility: HOSPITAL | Age: 60
End: 2025-02-12
Payer: MEDICARE

## 2025-02-12 ENCOUNTER — ANESTHESIA EVENT (OUTPATIENT)
Dept: ENDOSCOPY | Facility: HOSPITAL | Age: 60
End: 2025-02-12
Payer: MEDICARE

## 2025-02-12 DIAGNOSIS — K62.5 RECTAL BLEEDING: ICD-10-CM

## 2025-02-12 DIAGNOSIS — K62.1 RECTAL POLYP: Primary | ICD-10-CM

## 2025-02-12 DIAGNOSIS — R19.4 CHANGE IN BOWEL HABITS: ICD-10-CM

## 2025-02-12 LAB — POCT GLUCOSE: 92 MG/DL (ref 70–110)

## 2025-02-12 PROCEDURE — 88305 TISSUE EXAM BY PATHOLOGIST: CPT | Performed by: INTERNAL MEDICINE

## 2025-02-12 PROCEDURE — 27201423 OPTIME MED/SURG SUP & DEVICES STERILE SUPPLY: Performed by: INTERNAL MEDICINE

## 2025-02-12 PROCEDURE — 25000003 PHARM REV CODE 250: Performed by: NURSE ANESTHETIST, CERTIFIED REGISTERED

## 2025-02-12 PROCEDURE — 37000008 HC ANESTHESIA 1ST 15 MINUTES: Performed by: INTERNAL MEDICINE

## 2025-02-12 PROCEDURE — 88341 IMHCHEM/IMCYTCHM EA ADD ANTB: CPT

## 2025-02-12 PROCEDURE — 45385 COLONOSCOPY W/LESION REMOVAL: CPT | Performed by: INTERNAL MEDICINE

## 2025-02-12 PROCEDURE — 88342 IMHCHEM/IMCYTCHM 1ST ANTB: CPT

## 2025-02-12 PROCEDURE — 63600175 PHARM REV CODE 636 W HCPCS: Performed by: NURSE ANESTHETIST, CERTIFIED REGISTERED

## 2025-02-12 PROCEDURE — 37000009 HC ANESTHESIA EA ADD 15 MINS: Performed by: INTERNAL MEDICINE

## 2025-02-12 RX ORDER — ROSUVASTATIN CALCIUM 10 MG/1
10 TABLET, COATED ORAL DAILY
COMMUNITY

## 2025-02-12 RX ORDER — PROPOFOL 10 MG/ML
VIAL (ML) INTRAVENOUS
Status: COMPLETED
Start: 2025-02-12 | End: 2025-02-12

## 2025-02-12 RX ORDER — LIDOCAINE HYDROCHLORIDE 20 MG/ML
INJECTION, SOLUTION EPIDURAL; INFILTRATION; INTRACAUDAL; PERINEURAL
Status: DISCONTINUED
Start: 2025-02-12 | End: 2025-02-12 | Stop reason: HOSPADM

## 2025-02-12 RX ORDER — LIDOCAINE HYDROCHLORIDE 20 MG/ML
INJECTION INTRAVENOUS
Status: DISCONTINUED | OUTPATIENT
Start: 2025-02-12 | End: 2025-02-12

## 2025-02-12 RX ORDER — PROPOFOL 10 MG/ML
VIAL (ML) INTRAVENOUS CONTINUOUS PRN
Status: DISCONTINUED | OUTPATIENT
Start: 2025-02-12 | End: 2025-02-12

## 2025-02-12 RX ADMIN — SODIUM CHLORIDE: 9 INJECTION, SOLUTION INTRAVENOUS at 08:02

## 2025-02-12 RX ADMIN — LIDOCAINE HYDROCHLORIDE 40 MG: 20 INJECTION INTRAVENOUS at 08:02

## 2025-02-12 RX ADMIN — PROPOFOL 150 MCG/KG/MIN: 10 INJECTION, EMULSION INTRAVENOUS at 08:02

## 2025-02-12 NOTE — H&P
History & Physical      HPI:    59-year-old woman with morbid obesity here for colonoscopy.  She also has prior AFib is on anticoagulation.  She started having some rectal bleeding December 2024 that was painless but did see some clots in his.  Her last colonoscopy with Dr. Mcdermott was in 2017 and this was normal.  Most recent labs in December show hemoglobin of 11.1 in in April 2024 she was 14.2.      PCP:    Jermaine Scott MD        Review of patient's allergies indicates:  No Known Allergies         No current facility-administered medications for this encounter.       Medications Prior to Admission   Medication Sig Dispense Refill Last Dose/Taking    diltiaZEM (CARDIZEM CD) 300 MG 24 hr capsule Take 300 mg by mouth once daily.   2/12/2025 Morning    losartan-hydrochlorothiazide 50-12.5 mg (HYZAAR) 50-12.5 mg per tablet    2/12/2025    rosuvastatin (CRESTOR) 10 MG tablet Take 10 mg by mouth once daily.   2/11/2025 Evening    sotaloL (BETAPACE) 80 MG tablet    2/12/2025    vitamin D3-vitamin K2 1000-90 unit-mcg TbDL Take 25 mcg by mouth.   2/11/2025    XIGDUO XR 5-1,000 mg TBph Take 1 tablet by mouth once daily.   2/11/2025    furosemide (LASIX) 40 MG tablet Take 40 mg by mouth.   More than a month    LIVALO 2 mg Tab tablet    Unknown    ondansetron (ZOFRAN-ODT) 4 MG TbDL Take 2 tablets (8 mg total) by mouth every 6 (six) hours as needed. 25 tablet 1 Unknown    ondansetron (ZOFRAN-ODT) 4 MG TbDL Take 1 tablet (4 mg total) by mouth 2 (two) times daily. 20 tablet 0 Unknown    oxyCODONE-acetaminophen (PERCOCET) 5-325 mg per tablet Take 1 tablet by mouth every 4 (four) hours as needed for Pain. 30 tablet 0 Unknown    rivaroxaban (XARELTO) 20 mg Tab Take 20 mg by mouth.   2/9/2025 Morning    semaglutide (OZEMPIC) 2 mg/dose (8 mg/3 mL) PnIj Inject into the skin every 7 days.   1/27/2025           Past Medical History:    Past Medical History:   Diagnosis Date    Atrial fibrillation     Cancer of kidney     had  radiation    Diabetes mellitus, type 2     History of pericarditis     HLD (hyperlipidemia)     HTN (hypertension)     Obesity     Sleep apnea         Past Surgical History:    Past Surgical History:   Procedure Laterality Date    APPENDECTOMY      CARDIOVERSION  02/21/2017    CHOLECYSTECTOMY      FRACTURE SURGERY  05/26/2022    Lt arm    ORIF HUMERUS FRACTURE Left 02/16/2022    with bone graft  nonunion    TONSILLECTOMY      TOTAL ABDOMINAL HYSTERECTOMY W/ BILATERAL SALPINGOOPHORECTOMY N/A 12/07/2022    Procedure: HYSTERECTOMY, TOTAL, ABDOMINAL, WITH BILATERAL SALPINGO-OOPHORECTOMY;  Surgeon: Rancho Easley MD;  Location: [facility];  Service: OB/GYN;  Laterality: N/A;  ABNER / BSO //  (NO ROBOT)    TRANSESOPHAGEAL ECHOCARDIOGRAPHY  02/21/2017    VARICOSE VEIN STRIPPING  01/01/2019    Dilation of Left Common Iliac Vein   12/20/2017    Intramedullary Ananth Insertion Humerus  Left 05/27/2021    Pericardial window operation          Family History:    Family History   Problem Relation Name Age of Onset    Diabetes Mother Renella     Heart failure Mother Renella     Hypertension Mother Renella     Heart failure Father Brewster     Hypertension Father Brewster     Cancer Father Brewster     No Known Problems Sister      No Known Problems Brother         Social History:    Social History     Tobacco Use    Smoking status: Never    Smokeless tobacco: Never   Substance Use Topics    Alcohol use: Never            Review of Systems:    Review of Systems    Objective:            VITAL SIGNS: 24 HR MIN & MAX    LAST    Temp  Min: 97.5 °F (36.4 °C)  Max: 97.5 °F (36.4 °C)    97.5 °F (36.4 °C)    BP  Min: 139/76  Max: 139/76    139/76    Pulse  Min: 81  Max: 81    81    Resp  Min: 21  Max: 21    (!) 21    SpO2  Min: 99 %  Max: 99 %    99 % (room air)        No intake or output data in the 24 hours ending 02/12/25 0758        Physical Exam    Gen: NAD.  Alert and Oriented x 3.  Obesity  HENT: normocephalic, atraumatic.    CV:  s1s2  Lungs: ctab  Abd: soft.nt.nd +bs. No ttp          Recent Results (from the past 48 hours)   POCT glucose    Collection Time: 02/12/25  7:46 AM   Result Value Ref Range    POCT Glucose 92 70 - 110 mg/dL           No results found.              Assessment & Plan:     1. Rectal bleeding  2. Prior AFib on anticoagulation     Plan for colonoscopy

## 2025-02-12 NOTE — TRANSFER OF CARE
Anesthesia Transfer of Care Note    Patient: Poppy Weller    Procedure(s) Performed: Procedure(s) (LRB):  COLON (N/A)  COLONOSCOPY, WITH POLYPECTOMY USING SNARE    Patient location: GI    Anesthesia Type: general    Transport from OR: Transported from OR on room air with adequate spontaneous ventilation    Post pain: adequate analgesia    Post assessment: no apparent anesthetic complications    Post vital signs: stable    Level of consciousness: awake, alert and oriented    Nausea/Vomiting: no nausea/vomiting    Complications: none    Transfer of care protocol was followed      Last vitals: Visit Vitals  /68 (BP Location: Left arm, Patient Position: Lying)   Pulse 77   Temp 36.5 °C (97.7 °F) (Skin)   Resp 18   LMP 04/01/2022 (Approximate)   SpO2 95%   Breastfeeding No

## 2025-02-12 NOTE — DISCHARGE SUMMARY
[facility] End[facility]  Discharge Note  Short Stay    Procedure(s) (LRB):  COLON (N/A)  COLONOSCOPY, WITH POLYPECTOMY USING SNARE      OUTCOME: Condition has improved and patient is now ready for discharge.    DISPOSITION: Home or Self Care    FINAL DIAGNOSIS:  <principal problem not specified>    FOLLOWUP: With primary care provider    DISCHARGE INSTRUCTIONS:    Discharge Procedure Orders   Diet Adult Regular     Notify your health care provider if you experience any of the following:  temperature >100.4     Notify your health care provider if you experience any of the following:  severe uncontrolled pain     Notify your health care provider if you experience any of the following:  difficulty breathing or increased cough     Notify your health care provider if you experience any of the following:  persistent dizziness, light-headedness, or visual disturbances        TIME SPENT ON DISCHARGE: 3 minutes

## 2025-02-12 NOTE — ANESTHESIA PREPROCEDURE EVALUATION
"                                                                                                             02/12/2025  Poppy Weller is a 59 y.o., female.    Diagnosis:      Change in bowel habits [R19.4]      Rectal bleeding [K62.5]     Historical labs ok    Pre-op Assessment    I have reviewed the Patient Summary Reports.     I have reviewed the Nursing Notes. I have reviewed the NPO Status.   I have reviewed the Medications.     Review of Systems  Anesthesia Hx:  No problems with previous Anesthesia             Denies Family Hx of Anesthesia complications.    Denies Personal Hx of Anesthesia complications.                    Social:  Non-Smoker       Cardiovascular:     Hypertension    Dysrhythmias atrial fibrillation      hyperlipidemia    A. Fib, s/p ECV  Pericarditis Hx                           Pulmonary:        Sleep Apnea, CPAP                Hepatic/GI:  Hepatic/GI Normal                    Endocrine:  Diabetes   "pre-Diabetes"      Morbid Obesity / BMI > 40      Physical Exam  General: Alert and Oriented  Super Morbid Obesity  Airway:  Mallampati: II   Mouth Opening: Normal  TM Distance: Normal  Tongue: Large  Neck ROM: Normal ROM    Dental:  Intact        Anesthesia Plan  Type of Anesthesia, risks & benefits discussed:    Anesthesia Type: Gen Natural Airway  Intra-op Monitoring Plan: Standard ASA Monitors  Post Op Pain Control Plan: IV/PO Opioids PRN  (medical reason for not using multimodal pain management)  Induction:  IV  Informed Consent: Informed consent signed with the Patient and all parties understand the risks and agree with anesthesia plan.  All questions answered.   ASA Score: 3  Day of Surgery Review of History & Physical: H&P Update referred to the surgeon/provider.    Ready For Surgery From Anesthesia Perspective.     .      "

## 2025-02-12 NOTE — [employer] PATIENT INSTRUCTIONS
Discharge Summary/Instructions after an Endoscopic Procedure  Patient Name: Poppy Weller  Patient MRN: [id number]  Patient YOB: 1965  Wednesday, February 12, 2025  Nabeel Patel MD  Dear patient,  As a result of recent federal legislation (The Federal Cures Act), you may   receive lab or pathology results from your procedure in your [facility]   account before your physician is able to contact you. Your physician or   their representative will relay the results to you with their   recommendations at their soonest availability.  Thank you,  RESTRICTIONS:  During your procedure today, you received medications for sedation.  These   medications may affect your judgment, balance and coordination.  Therefore,   for 24 hours, you have the following restrictions:   - DO NOT drive a car, operate machinery, make legal/financial decisions,   sign important papers or drink alcohol.    ACTIVITY:  Today: no heavy lifting, straining or running due to procedural   sedation/anesthesia.  The following day: return to full activity including work.  DIET:  Eat and drink normally unless instructed otherwise.     TREATMENT FOR COMMON SIDE EFFECTS:  - Mild abdominal pain, nausea, belching, bloating or excessive gas:  rest,   eat lightly and use a heating pad.  - Sore Throat: treat with throat lozenges and/or gargle with warm salt   water.  - Because air was used during the procedure, expelling large amounts of air   from your rectum or belching is normal.  - If a bowel prep was taken, you may not have a bowel movement for 1-3 days.    This is normal.  SYMPTOMS TO WATCH FOR AND REPORT TO YOUR PHYSICIAN:  1. Abdominal pain or bloating, other than gas cramps.  2. Chest pain.  3. Back pain.  4. Signs of infection such as: chills or fever occurring within 24 hours   after the procedure.  5. Rectal bleeding, which would show as bright red, maroon, or black stools.   (A tablespoon of blood from the rectum is not serious,  especially if   hemorrhoids are present.)  6. Vomiting.  7. Weakness or dizziness.  GO DIRECTLY TO THE NEAREST EMERGENCY ROOM IF YOU HAVE ANY OF THE FOLLOWING:      Difficulty breathing              Chills and/or fever over 101 F   Persistent vomiting and/or vomiting blood   Severe abdominal pain   Severe chest pain   Black, tarry stools   Bleeding- more than one tablespoon   Any other symptom or condition that you feel may need urgent attention  Your doctor recommends these additional instructions:  If any biopsies were taken, your doctors clinic will contact you in 1 to 2   weeks with any results.  Recommendations:  - Await pathology results.   - Patient states she is not in A-fib any more.  she may need to consider   getting off of anti-coag if she does not need it. IF her injury in   descending colon is from radiation, this may be hard to heal completely and   if she needs anti-coag, this may continue to bleed  - Discharge patient to home.   - Resume previous diet today.   - Continue present medications.   - Repeat colonoscopy in 5 years for surveillance.  Impressions:  - Three 2 to 3 mm polyps in the rectum, in the descending colon and in the   transverse colon, removed with a cold snare.  Resected and retrieved.   - There was a discrete ulcer that was clean based in the descneding colon   around 3-4mm.  This was surrounded by telengiectasias that resmebled   radiation damage. This area was erythemetous.  Mutliple biopsies taken of   ulcer and adjacent tissue.    - Internal hemorrhoids.  For questions, problems or results please call your physician - Nabeel Patel MD at Work:  [phone].  [facility] ED at [phone]  IF A COMPLICATION OR EMERGENCY SITUATION ARISES AND YOU ARE UNABLE TO REACH   YOUR PHYSICIAN - GO DIRECTLY TO THE EMERGENCY ROOM.  MD Nabeel Puri MD  2/12/2025 8:54:32 AM  This report has been verified and signed  electronically.  Dear patient,  As a result of recent federal legislation (The Federal Cures Act), you may   receive lab or pathology results from your procedure in your [facility]   account before your physician is able to contact you. Your physician or   their representative will relay the results to you with their   recommendations at their soonest availability.  Thank you,  [employer]

## 2025-02-13 VITALS
TEMPERATURE: 97 F | HEART RATE: 67 BPM | SYSTOLIC BLOOD PRESSURE: 142 MMHG | RESPIRATION RATE: 17 BRPM | OXYGEN SATURATION: 100 % | DIASTOLIC BLOOD PRESSURE: 72 MMHG

## 2025-02-14 LAB — PSYCHE PATHOLOGY RESULT: NORMAL

## 2025-02-14 NOTE — ANESTHESIA POSTPROCEDURE EVALUATION
Anesthesia Post Evaluation    Patient: Poppy Weller    Procedure(s) Performed: Procedure(s) (LRB):  COLON (N/A)  COLONOSCOPY, WITH POLYPECTOMY USING SNARE    Final Anesthesia Type: general      Patient location during evaluation: PACU  Patient participation: Yes- Able to Participate  Level of consciousness: awake and alert  Post-procedure vital signs: reviewed and stable  Pain management: adequate  Airway patency: patent    PONV status at discharge: No PONV  Anesthetic complications: no      Respiratory status: unassisted  Hydration status: euvolemic  Follow-up not needed.              Vitals Value Taken Time   /72 02/12/25 0914   Temp 36.1 °C (97 °F) 02/12/25 0841   Pulse 67 02/12/25 0914   Resp 17 02/12/25 0914   SpO2 100 % 02/12/25 0914         No case tracking events are documented in the log.      Pain/Harini Score: No data recorded

## 2025-03-10 DIAGNOSIS — D41.02 NEOPLASM OF UNCERTAIN BEHAVIOR OF LEFT KIDNEY: Primary | ICD-10-CM

## 2025-03-19 ENCOUNTER — HOSPITAL ENCOUNTER (OUTPATIENT)
Dept: RADIOLOGY | Facility: HOSPITAL | Age: 60
Discharge: HOME | End: 2025-03-19
Attending: SPECIALIST
Payer: MEDICARE

## 2025-03-19 DIAGNOSIS — D41.02 NEOPLASM OF UNCERTAIN BEHAVIOR OF LEFT KIDNEY: ICD-10-CM

## 2025-03-19 PROCEDURE — 74183 MRI ABD W/O CNTR FLWD CNTR: CPT | Mod: TC

## 2025-03-19 PROCEDURE — 25500020 PHARM REV CODE 255: Performed by: SPECIALIST

## 2025-03-19 PROCEDURE — A9577 INJ MULTIHANCE: HCPCS | Performed by: SPECIALIST

## 2025-03-19 RX ADMIN — GADOBENATE DIMEGLUMINE 20 ML: 529 INJECTION, SOLUTION INTRAVENOUS at 09:03

## 2025-08-06 DIAGNOSIS — D41.02 NEOPLASM OF UNCERTAIN BEHAVIOR OF LEFT KIDNEY: Primary | ICD-10-CM

## 2025-08-15 ENCOUNTER — HOSPITAL ENCOUNTER (OUTPATIENT)
Dept: RADIOLOGY | Facility: HOSPITAL | Age: 60
Discharge: HOME | End: 2025-08-15
Attending: INTERNAL MEDICINE
Payer: MEDICARE

## 2025-08-15 DIAGNOSIS — Z12.31 ENCOUNTER FOR SCREENING MAMMOGRAM FOR BREAST CANCER: ICD-10-CM

## 2025-08-15 PROCEDURE — 77063 BREAST TOMOSYNTHESIS BI: CPT | Mod: TC

## 2025-08-15 PROCEDURE — 77063 BREAST TOMOSYNTHESIS BI: CPT | Mod: 26,,, | Performed by: RADIOLOGY

## 2025-08-15 PROCEDURE — 77067 SCR MAMMO BI INCL CAD: CPT | Mod: 26,,, | Performed by: RADIOLOGY

## (undated) DEVICE — SUT CHROMIC 2-0 SH 27IN BRN

## (undated) DEVICE — KIT SURGICAL TURNOVER

## (undated) DEVICE — SUT CHRMC GUT 1 BRN CT-1 CR 18

## (undated) DEVICE — PAD PREP 50/CA

## (undated) DEVICE — DRAPE ORTH SPLIT 77X108IN

## (undated) DEVICE — BINDER ABD 12IN SM/MED 30-45IN

## (undated) DEVICE — SNARE CAPTIVATOR II RND 20MM

## (undated) DEVICE — TRAY SKIN SCRUB WET PREMIUM

## (undated) DEVICE — COLLECTION SPECIMEN NEPTUNE

## (undated) DEVICE — SUT VICRYL PLUS 1 CTX 27IN

## (undated) DEVICE — KIT SURGICAL COLON .25 1.1OZ

## (undated) DEVICE — ELECTRODE PATIENT RETURN DISP

## (undated) DEVICE — APPLICATOR CHLORAPREP ORN 26ML

## (undated) DEVICE — GLOVE PROTEXIS HYDROGEL SZ7

## (undated) DEVICE — BLOCK BLOX BITE DENT RIM 54FR

## (undated) DEVICE — CONTAINER SPECIMEN 4OZ

## (undated) DEVICE — UNDERPAD PROTECT PLUS 17X24IN

## (undated) DEVICE — ADAPTER DUAL NSL LUER M-M 7FT

## (undated) DEVICE — KIT GYN MAJOR ABD LAFAYETTE

## (undated) DEVICE — STAPLER SKIN PROXIMATE WIDE

## (undated) DEVICE — GLOVE PROTEXIS HYDROGEL SZ6.5

## (undated) DEVICE — SOL IRRI STRL WATER 1000ML

## (undated) DEVICE — TIP SUCTION YANKAUER

## (undated) DEVICE — Device

## (undated) DEVICE — SUT VICRYL PLUS ANTIBACT

## (undated) DEVICE — SUT VICRYL 1 OB 36 CTX

## (undated) DEVICE — ELECTRODE BLD EXT 6.50 ST DISP

## (undated) DEVICE — SUT 1 18IN CHROMIC GUT

## (undated) DEVICE — SPONGE LAP STRL 18X18IN

## (undated) DEVICE — SUT PLAIN MONO 2-0 XLH 27IN

## (undated) DEVICE — DRAPE FLUID WARMER ORS 44X44IN

## (undated) DEVICE — GLOVE PROTEXIS LTX MICRO  7.5

## (undated) DEVICE — KIT CANIST SUCTION 1200CC

## (undated) DEVICE — SOL NACL IRR 1000ML BTL

## (undated) DEVICE — TRAY CATH FOL SIL URIMTR 16FR

## (undated) DEVICE — HEMOSTAT SURGICEL PWD 3G

## (undated) DEVICE — SUT CHROMIC GUT 2-0 CT-1 27IN

## (undated) DEVICE — DRAPE TOP 53X102IN